# Patient Record
Sex: FEMALE | Race: WHITE | NOT HISPANIC OR LATINO
[De-identification: names, ages, dates, MRNs, and addresses within clinical notes are randomized per-mention and may not be internally consistent; named-entity substitution may affect disease eponyms.]

---

## 2017-01-12 ENCOUNTER — APPOINTMENT (OUTPATIENT)
Dept: RHEUMATOLOGY | Facility: CLINIC | Age: 32
End: 2017-01-12

## 2017-01-12 VITALS — WEIGHT: 194 LBS | SYSTOLIC BLOOD PRESSURE: 98 MMHG | DIASTOLIC BLOOD PRESSURE: 70 MMHG | BODY MASS INDEX: 30.84 KG/M2

## 2017-01-13 ENCOUNTER — RX RENEWAL (OUTPATIENT)
Age: 32
End: 2017-01-13

## 2017-01-13 LAB
25(OH)D3 SERPL-MCNC: 17.9 NG/ML
ALBUMIN SERPL ELPH-MCNC: 3.9 G/DL
ALP BLD-CCNC: 97 U/L
ALT SERPL-CCNC: 17 U/L
ANION GAP SERPL CALC-SCNC: 15 MMOL/L
APPEARANCE: CLEAR
AST SERPL-CCNC: 18 U/L
BASOPHILS # BLD AUTO: 0.02 K/UL
BASOPHILS NFR BLD AUTO: 0.6 %
BILIRUB SERPL-MCNC: <0.2 MG/DL
BILIRUBIN URINE: NEGATIVE
BLOOD URINE: NEGATIVE
BUN SERPL-MCNC: 13 MG/DL
CALCIUM SERPL-MCNC: 9.4 MG/DL
CHLORIDE SERPL-SCNC: 107 MMOL/L
CO2 SERPL-SCNC: 20 MMOL/L
COLOR: YELLOW
CREAT SERPL-MCNC: 0.69 MG/DL
EOSINOPHIL # BLD AUTO: 0.1 K/UL
EOSINOPHIL NFR BLD AUTO: 3.1 %
ERYTHROCYTE [SEDIMENTATION RATE] IN BLOOD BY WESTERGREN METHOD: 23 MM/HR
GLUCOSE QUALITATIVE U: NORMAL MG/DL
GLUCOSE SERPL-MCNC: 90 MG/DL
HCT VFR BLD CALC: 39.8 %
HGB BLD-MCNC: 12.2 G/DL
IMM GRANULOCYTES NFR BLD AUTO: 0 %
KETONES URINE: NEGATIVE
LEUKOCYTE ESTERASE URINE: NEGATIVE
LYMPHOCYTES # BLD AUTO: 1.4 K/UL
LYMPHOCYTES NFR BLD AUTO: 42.8 %
MAN DIFF?: NORMAL
MCHC RBC-ENTMCNC: 25.9 PG
MCHC RBC-ENTMCNC: 30.7 GM/DL
MCV RBC AUTO: 84.5 FL
MONOCYTES # BLD AUTO: 0.29 K/UL
MONOCYTES NFR BLD AUTO: 8.9 %
NEUTROPHILS # BLD AUTO: 1.46 K/UL
NEUTROPHILS NFR BLD AUTO: 44.6 %
NITRITE URINE: NEGATIVE
PH URINE: 7
PLATELET # BLD AUTO: 252 K/UL
POTASSIUM SERPL-SCNC: 4.8 MMOL/L
PROT SERPL-MCNC: 6.8 G/DL
PROTEIN URINE: NEGATIVE MG/DL
RBC # BLD: 4.71 M/UL
RBC # FLD: 16 %
SODIUM SERPL-SCNC: 142 MMOL/L
SPECIFIC GRAVITY URINE: 1.02
UROBILINOGEN URINE: NORMAL MG/DL
WBC # FLD AUTO: 3.27 K/UL

## 2017-02-01 ENCOUNTER — OUTPATIENT (OUTPATIENT)
Dept: OUTPATIENT SERVICES | Facility: HOSPITAL | Age: 32
LOS: 1 days | End: 2017-02-01
Payer: MEDICARE

## 2017-02-01 DIAGNOSIS — M35.00 SJOGREN SYNDROME, UNSPECIFIED: ICD-10-CM

## 2017-02-01 PROCEDURE — 94729 DIFFUSING CAPACITY: CPT

## 2017-02-01 PROCEDURE — 94726 PLETHYSMOGRAPHY LUNG VOLUMES: CPT | Mod: 26

## 2017-02-01 PROCEDURE — 94726 PLETHYSMOGRAPHY LUNG VOLUMES: CPT

## 2017-02-01 PROCEDURE — 94070 EVALUATION OF WHEEZING: CPT | Mod: 26

## 2017-02-01 PROCEDURE — 94060 EVALUATION OF WHEEZING: CPT

## 2017-02-01 PROCEDURE — 94760 N-INVAS EAR/PLS OXIMETRY 1: CPT

## 2017-02-01 PROCEDURE — 94729 DIFFUSING CAPACITY: CPT | Mod: 26

## 2017-03-06 ENCOUNTER — RX RENEWAL (OUTPATIENT)
Age: 32
End: 2017-03-06

## 2017-03-27 ENCOUNTER — APPOINTMENT (OUTPATIENT)
Dept: ENDOCRINOLOGY | Facility: CLINIC | Age: 32
End: 2017-03-27

## 2017-04-13 ENCOUNTER — APPOINTMENT (OUTPATIENT)
Dept: RHEUMATOLOGY | Facility: CLINIC | Age: 32
End: 2017-04-13

## 2017-04-27 ENCOUNTER — APPOINTMENT (OUTPATIENT)
Dept: ENDOCRINOLOGY | Facility: CLINIC | Age: 32
End: 2017-04-27

## 2017-04-27 VITALS
DIASTOLIC BLOOD PRESSURE: 85 MMHG | HEART RATE: 61 BPM | WEIGHT: 185 LBS | SYSTOLIC BLOOD PRESSURE: 127 MMHG | BODY MASS INDEX: 29.41 KG/M2

## 2017-05-02 ENCOUNTER — RESULT CHARGE (OUTPATIENT)
Age: 32
End: 2017-05-02

## 2017-05-02 ENCOUNTER — OTHER (OUTPATIENT)
Age: 32
End: 2017-05-02

## 2017-05-02 LAB
25(OH)D3 SERPL-MCNC: 29.8 NG/ML
ALBUMIN SERPL ELPH-MCNC: 4.2 G/DL
ALP BLD-CCNC: 87 U/L
ALT SERPL-CCNC: 13 U/L
ANION GAP SERPL CALC-SCNC: 19 MMOL/L
AST SERPL-CCNC: 12 U/L
BILIRUB SERPL-MCNC: <0.2 MG/DL
BUN SERPL-MCNC: 15 MG/DL
CALCIUM SERPL-MCNC: 9.6 MG/DL
CHLORIDE SERPL-SCNC: 104 MMOL/L
CO2 SERPL-SCNC: 19 MMOL/L
CREAT SERPL-MCNC: 0.77 MG/DL
GLUCOSE SERPL-MCNC: 86 MG/DL
POTASSIUM SERPL-SCNC: 4.2 MMOL/L
PROT SERPL-MCNC: 7.1 G/DL
SODIUM SERPL-SCNC: 142 MMOL/L
T3FREE SERPL-MCNC: 2.21 PG/ML
T4 FREE SERPL-MCNC: 1.1 NG/DL
TSH SERPL-ACNC: 0.96 UIU/ML

## 2017-05-11 ENCOUNTER — RX RENEWAL (OUTPATIENT)
Age: 32
End: 2017-05-11

## 2017-05-11 ENCOUNTER — APPOINTMENT (OUTPATIENT)
Dept: RHEUMATOLOGY | Facility: CLINIC | Age: 32
End: 2017-05-11

## 2017-05-11 VITALS — WEIGHT: 184 LBS | DIASTOLIC BLOOD PRESSURE: 65 MMHG | BODY MASS INDEX: 29.25 KG/M2 | SYSTOLIC BLOOD PRESSURE: 110 MMHG

## 2017-06-01 ENCOUNTER — APPOINTMENT (OUTPATIENT)
Dept: RHEUMATOLOGY | Facility: CLINIC | Age: 32
End: 2017-06-01

## 2017-06-19 ENCOUNTER — RX RENEWAL (OUTPATIENT)
Age: 32
End: 2017-06-19

## 2017-06-19 RX ORDER — UBIDECARENONE/VIT E ACET 100MG-5
50 MCG CAPSULE ORAL DAILY
Qty: 90 | Refills: 0 | Status: ACTIVE | COMMUNITY
Start: 2017-06-19 | End: 1900-01-01

## 2017-07-06 ENCOUNTER — RX RENEWAL (OUTPATIENT)
Age: 32
End: 2017-07-06

## 2017-09-07 ENCOUNTER — APPOINTMENT (OUTPATIENT)
Dept: RHEUMATOLOGY | Facility: CLINIC | Age: 32
End: 2017-09-07
Payer: MEDICARE

## 2017-09-07 VITALS — SYSTOLIC BLOOD PRESSURE: 110 MMHG | WEIGHT: 171 LBS | DIASTOLIC BLOOD PRESSURE: 70 MMHG | BODY MASS INDEX: 27.19 KG/M2

## 2017-09-07 PROCEDURE — 36415 COLL VENOUS BLD VENIPUNCTURE: CPT

## 2017-09-07 PROCEDURE — 99214 OFFICE O/P EST MOD 30 MIN: CPT | Mod: 25

## 2017-09-11 LAB
25(OH)D3 SERPL-MCNC: 27.3 NG/ML
ALBUMIN SERPL ELPH-MCNC: 4.2 G/DL
ALP BLD-CCNC: 73 U/L
ALT SERPL-CCNC: 12 U/L
AMYLASE/CREAT SERPL: 47 U/L
ANION GAP SERPL CALC-SCNC: 15 MMOL/L
AST SERPL-CCNC: 15 U/L
BASOPHILS # BLD AUTO: 0 K/UL
BASOPHILS NFR BLD AUTO: 0 %
BILIRUB SERPL-MCNC: 0.2 MG/DL
BUN SERPL-MCNC: 14 MG/DL
C3 SERPL-MCNC: 113 MG/DL
C4 SERPL-MCNC: 30 MG/DL
CALCIUM SERPL-MCNC: 9.7 MG/DL
CHLORIDE SERPL-SCNC: 107 MMOL/L
CO2 SERPL-SCNC: 20 MMOL/L
CREAT SERPL-MCNC: 0.7 MG/DL
EOSINOPHIL # BLD AUTO: 0.03 K/UL
EOSINOPHIL NFR BLD AUTO: 0.9 %
ERYTHROCYTE [SEDIMENTATION RATE] IN BLOOD BY WESTERGREN METHOD: 16 MM/HR
GLUCOSE SERPL-MCNC: 99 MG/DL
HCT VFR BLD CALC: 41.3 %
HGB BLD-MCNC: 13.1 G/DL
IMM GRANULOCYTES NFR BLD AUTO: 0.3 %
LPL SERPL-CCNC: 21 U/L
LYMPHOCYTES # BLD AUTO: 0.94 K/UL
LYMPHOCYTES NFR BLD AUTO: 29.6 %
MAN DIFF?: NORMAL
MCHC RBC-ENTMCNC: 28.4 PG
MCHC RBC-ENTMCNC: 31.7 GM/DL
MCV RBC AUTO: 89.4 FL
MONOCYTES # BLD AUTO: 0.22 K/UL
MONOCYTES NFR BLD AUTO: 6.9 %
NEUTROPHILS # BLD AUTO: 1.98 K/UL
NEUTROPHILS NFR BLD AUTO: 62.3 %
PLATELET # BLD AUTO: 232 K/UL
POTASSIUM SERPL-SCNC: 5.2 MMOL/L
PROT SERPL-MCNC: 7.4 G/DL
RBC # BLD: 4.62 M/UL
RBC # FLD: 13.7 %
SODIUM SERPL-SCNC: 142 MMOL/L
WBC # FLD AUTO: 3.18 K/UL

## 2017-10-30 ENCOUNTER — APPOINTMENT (OUTPATIENT)
Dept: ENDOCRINOLOGY | Facility: CLINIC | Age: 32
End: 2017-10-30

## 2017-11-29 ENCOUNTER — APPOINTMENT (OUTPATIENT)
Dept: ENDOCRINOLOGY | Facility: CLINIC | Age: 32
End: 2017-11-29
Payer: MEDICARE

## 2017-11-29 VITALS
BODY MASS INDEX: 24.72 KG/M2 | DIASTOLIC BLOOD PRESSURE: 79 MMHG | HEART RATE: 85 BPM | HEIGHT: 67 IN | WEIGHT: 157.5 LBS | SYSTOLIC BLOOD PRESSURE: 129 MMHG

## 2017-11-29 PROCEDURE — 99214 OFFICE O/P EST MOD 30 MIN: CPT | Mod: 25

## 2017-11-29 PROCEDURE — 36415 COLL VENOUS BLD VENIPUNCTURE: CPT

## 2017-11-30 ENCOUNTER — OTHER (OUTPATIENT)
Age: 32
End: 2017-11-30

## 2017-11-30 LAB
T4 FREE SERPL-MCNC: 1.4 NG/DL
TSH SERPL-ACNC: 0.08 UIU/ML

## 2018-01-11 ENCOUNTER — LABORATORY RESULT (OUTPATIENT)
Age: 33
End: 2018-01-11

## 2018-01-11 ENCOUNTER — APPOINTMENT (OUTPATIENT)
Dept: RHEUMATOLOGY | Facility: CLINIC | Age: 33
End: 2018-01-11
Payer: MEDICARE

## 2018-01-11 VITALS
BODY MASS INDEX: 24.28 KG/M2 | SYSTOLIC BLOOD PRESSURE: 120 MMHG | HEART RATE: 82 BPM | DIASTOLIC BLOOD PRESSURE: 69 MMHG | WEIGHT: 155 LBS

## 2018-01-11 PROCEDURE — 99214 OFFICE O/P EST MOD 30 MIN: CPT

## 2018-01-11 PROCEDURE — 36415 COLL VENOUS BLD VENIPUNCTURE: CPT

## 2018-01-12 LAB
ALBUMIN SERPL ELPH-MCNC: 3.8 G/DL
ALP BLD-CCNC: 73 U/L
ALT SERPL-CCNC: 17 U/L
ANION GAP SERPL CALC-SCNC: 14 MMOL/L
APPEARANCE: CLEAR
AST SERPL-CCNC: 13 U/L
BASOPHILS # BLD AUTO: 0 K/UL
BASOPHILS NFR BLD AUTO: 0 %
BILIRUB SERPL-MCNC: <0.2 MG/DL
BILIRUBIN URINE: NEGATIVE
BLOOD URINE: NEGATIVE
BUN SERPL-MCNC: 12 MG/DL
CALCIUM SERPL-MCNC: 9.3 MG/DL
CHLORIDE SERPL-SCNC: 112 MMOL/L
CO2 SERPL-SCNC: 21 MMOL/L
COLOR: YELLOW
CREAT SERPL-MCNC: 0.77 MG/DL
ENDOMYSIUM IGA SER QL: NEGATIVE
ENDOMYSIUM IGA TITR SER: NORMAL
EOSINOPHIL # BLD AUTO: 0.05 K/UL
EOSINOPHIL NFR BLD AUTO: 1.8
ERYTHROCYTE [SEDIMENTATION RATE] IN BLOOD BY WESTERGREN METHOD: 3 MM/HR
GLUCOSE QUALITATIVE U: NEGATIVE MG/DL
GLUCOSE SERPL-MCNC: 92 MG/DL
HCT VFR BLD CALC: 40.8 %
HGB BLD-MCNC: 13 G/DL
KETONES URINE: NEGATIVE
LEUKOCYTE ESTERASE URINE: NEGATIVE
LYMPHOCYTES # BLD AUTO: 0.88 K/UL
LYMPHOCYTES NFR BLD AUTO: 33.3 %
MAN DIFF?: NORMAL
MCHC RBC-ENTMCNC: 28.7 PG
MCHC RBC-ENTMCNC: 31.9 GM/DL
MCV RBC AUTO: 90.1 FL
MONOCYTES # BLD AUTO: 0.24 K/UL
MONOCYTES NFR BLD AUTO: 9 %
NEUTROPHILS # BLD AUTO: 1.4 K/UL
NEUTROPHILS NFR BLD AUTO: 53.2 %
NITRITE URINE: NEGATIVE
PH URINE: 5
PLATELET # BLD AUTO: 227 K/UL
POTASSIUM SERPL-SCNC: 5.2 MMOL/L
PROT SERPL-MCNC: 6.8 G/DL
PROTEIN URINE: NEGATIVE MG/DL
RBC # BLD: 4.53 M/UL
RBC # FLD: 13.6 %
SODIUM SERPL-SCNC: 147 MMOL/L
SPECIFIC GRAVITY URINE: 1.01
TTG IGA SER IA-ACNC: <5 UNITS
TTG IGA SER-ACNC: NEGATIVE
TTG IGG SER IA-ACNC: <5 UNITS
TTG IGG SER IA-ACNC: NEGATIVE
UROBILINOGEN URINE: NEGATIVE MG/DL
WBC # FLD AUTO: 2.64 K/UL

## 2018-01-14 ENCOUNTER — TRANSCRIPTION ENCOUNTER (OUTPATIENT)
Age: 33
End: 2018-01-14

## 2018-04-12 ENCOUNTER — APPOINTMENT (OUTPATIENT)
Dept: RHEUMATOLOGY | Facility: CLINIC | Age: 33
End: 2018-04-12
Payer: MEDICARE

## 2018-04-12 ENCOUNTER — LABORATORY RESULT (OUTPATIENT)
Age: 33
End: 2018-04-12

## 2018-04-12 VITALS — DIASTOLIC BLOOD PRESSURE: 60 MMHG | SYSTOLIC BLOOD PRESSURE: 112 MMHG

## 2018-04-12 VITALS — BODY MASS INDEX: 25.22 KG/M2 | WEIGHT: 161 LBS

## 2018-04-12 PROCEDURE — 99214 OFFICE O/P EST MOD 30 MIN: CPT | Mod: 25

## 2018-04-12 PROCEDURE — 36415 COLL VENOUS BLD VENIPUNCTURE: CPT

## 2018-04-16 LAB
ALBUMIN SERPL ELPH-MCNC: 3.8 G/DL
ALP BLD-CCNC: 76 U/L
ALT SERPL-CCNC: 11 U/L
ANION GAP SERPL CALC-SCNC: 11 MMOL/L
APPEARANCE: CLEAR
AST SERPL-CCNC: 13 U/L
BASOPHILS # BLD AUTO: 0 K/UL
BASOPHILS NFR BLD AUTO: 0 %
BILIRUB SERPL-MCNC: <0.2 MG/DL
BILIRUBIN URINE: NEGATIVE
BLOOD URINE: NEGATIVE
BUN SERPL-MCNC: 16 MG/DL
CALCIUM SERPL-MCNC: 9.1 MG/DL
CHLORIDE SERPL-SCNC: 111 MMOL/L
CO2 SERPL-SCNC: 20 MMOL/L
COLOR: YELLOW
CREAT SERPL-MCNC: 0.66 MG/DL
EOSINOPHIL # BLD AUTO: 0.04 K/UL
EOSINOPHIL NFR BLD AUTO: 1.4 %
ERYTHROCYTE [SEDIMENTATION RATE] IN BLOOD BY WESTERGREN METHOD: 8 MM/HR
GLUCOSE QUALITATIVE U: NEGATIVE MG/DL
GLUCOSE SERPL-MCNC: 94 MG/DL
HCT VFR BLD CALC: 38.1 %
HGB BLD-MCNC: 12.2 G/DL
IMM GRANULOCYTES NFR BLD AUTO: 0 %
KETONES URINE: NEGATIVE
LEUKOCYTE ESTERASE URINE: NEGATIVE
LYMPHOCYTES # BLD AUTO: 0.83 K/UL
LYMPHOCYTES NFR BLD AUTO: 29 %
MAGNESIUM SERPL-MCNC: 1.9 MG/DL
MAN DIFF?: NORMAL
MCHC RBC-ENTMCNC: 27.9 PG
MCHC RBC-ENTMCNC: 32 GM/DL
MCV RBC AUTO: 87.2 FL
MONOCYTES # BLD AUTO: 0.3 K/UL
MONOCYTES NFR BLD AUTO: 10.5 %
NEUTROPHILS # BLD AUTO: 1.69 K/UL
NEUTROPHILS NFR BLD AUTO: 59.1 %
NITRITE URINE: NEGATIVE
PH URINE: 6.5
PLATELET # BLD AUTO: 231 K/UL
POTASSIUM SERPL-SCNC: 4.8 MMOL/L
PROT SERPL-MCNC: 6.5 G/DL
PROTEIN URINE: NEGATIVE MG/DL
RBC # BLD: 4.37 M/UL
RBC # FLD: 14.1 %
SODIUM SERPL-SCNC: 142 MMOL/L
SPECIFIC GRAVITY URINE: 1.02
UROBILINOGEN URINE: NEGATIVE MG/DL
WBC # FLD AUTO: 2.86 K/UL

## 2018-04-26 ENCOUNTER — RX RENEWAL (OUTPATIENT)
Age: 33
End: 2018-04-26

## 2018-06-04 ENCOUNTER — APPOINTMENT (OUTPATIENT)
Dept: ENDOCRINOLOGY | Facility: CLINIC | Age: 33
End: 2018-06-04
Payer: MEDICARE

## 2018-06-04 VITALS
SYSTOLIC BLOOD PRESSURE: 120 MMHG | HEIGHT: 67 IN | HEART RATE: 85 BPM | DIASTOLIC BLOOD PRESSURE: 67 MMHG | BODY MASS INDEX: 26.37 KG/M2 | WEIGHT: 168 LBS

## 2018-06-04 PROCEDURE — 36415 COLL VENOUS BLD VENIPUNCTURE: CPT

## 2018-06-04 PROCEDURE — 99214 OFFICE O/P EST MOD 30 MIN: CPT | Mod: 25

## 2018-06-07 ENCOUNTER — OTHER (OUTPATIENT)
Age: 33
End: 2018-06-07

## 2018-07-02 ENCOUNTER — RX RENEWAL (OUTPATIENT)
Age: 33
End: 2018-07-02

## 2018-07-02 LAB
T3FREE SERPL-MCNC: 2.37 PG/ML
T4 FREE SERPL-MCNC: 1.7 NG/DL
TSH SERPL-ACNC: 0.42 UIU/ML

## 2018-07-11 ENCOUNTER — LABORATORY RESULT (OUTPATIENT)
Age: 33
End: 2018-07-11

## 2018-07-12 ENCOUNTER — APPOINTMENT (OUTPATIENT)
Dept: RHEUMATOLOGY | Facility: CLINIC | Age: 33
End: 2018-07-12
Payer: MEDICARE

## 2018-07-12 VITALS — BODY MASS INDEX: 28.19 KG/M2 | SYSTOLIC BLOOD PRESSURE: 105 MMHG | WEIGHT: 180 LBS | DIASTOLIC BLOOD PRESSURE: 72 MMHG

## 2018-07-12 PROCEDURE — 99214 OFFICE O/P EST MOD 30 MIN: CPT | Mod: 25

## 2018-07-12 PROCEDURE — 36415 COLL VENOUS BLD VENIPUNCTURE: CPT

## 2018-07-18 LAB
ALBUMIN SERPL ELPH-MCNC: 4.2 G/DL
ALP BLD-CCNC: 98 U/L
ALT SERPL-CCNC: 15 U/L
ANION GAP SERPL CALC-SCNC: 10 MMOL/L
APPEARANCE: CLEAR
AST SERPL-CCNC: 14 U/L
BASOPHILS # BLD AUTO: 0 K/UL
BASOPHILS NFR BLD AUTO: 0 %
BILIRUB SERPL-MCNC: <0.2 MG/DL
BILIRUBIN URINE: NEGATIVE
BLOOD URINE: NEGATIVE
BUN SERPL-MCNC: 11 MG/DL
C3 SERPL-MCNC: 121 MG/DL
C4 SERPL-MCNC: 31 MG/DL
CALCIUM SERPL-MCNC: 9.7 MG/DL
CHLORIDE SERPL-SCNC: 110 MMOL/L
CO2 SERPL-SCNC: 24 MMOL/L
COLOR: YELLOW
CREAT SERPL-MCNC: 0.63 MG/DL
EOSINOPHIL # BLD AUTO: 0.02 K/UL
EOSINOPHIL NFR BLD AUTO: 0.9 %
ERYTHROCYTE [SEDIMENTATION RATE] IN BLOOD BY WESTERGREN METHOD: 16 MM/HR
GLUCOSE QUALITATIVE U: NEGATIVE MG/DL
GLUCOSE SERPL-MCNC: 91 MG/DL
HCT VFR BLD CALC: 40.9 %
HGB BLD-MCNC: 12.7 G/DL
KETONES URINE: NEGATIVE
LEUKOCYTE ESTERASE URINE: NEGATIVE
LYMPHOCYTES # BLD AUTO: 1.4 K/UL
LYMPHOCYTES NFR BLD AUTO: 52.1 %
MAN DIFF?: NORMAL
MCHC RBC-ENTMCNC: 27.2 PG
MCHC RBC-ENTMCNC: 31.1 GM/DL
MCV RBC AUTO: 87.6 FL
MONOCYTES # BLD AUTO: 0.42 K/UL
MONOCYTES NFR BLD AUTO: 15.7 %
NEUTROPHILS # BLD AUTO: 0.73 K/UL
NEUTROPHILS NFR BLD AUTO: 27 %
NITRITE URINE: NEGATIVE
PH URINE: 7
PLATELET # BLD AUTO: 281 K/UL
POTASSIUM SERPL-SCNC: 5.6 MMOL/L
PROT SERPL-MCNC: 7 G/DL
PROTEIN URINE: NEGATIVE MG/DL
RBC # BLD: 4.67 M/UL
RBC # FLD: 13.9 %
SODIUM SERPL-SCNC: 144 MMOL/L
SPECIFIC GRAVITY URINE: 1.01
UROBILINOGEN URINE: NEGATIVE MG/DL
WBC # FLD AUTO: 2.69 K/UL

## 2018-09-26 ENCOUNTER — RX RENEWAL (OUTPATIENT)
Age: 33
End: 2018-09-26

## 2018-09-27 ENCOUNTER — RX RENEWAL (OUTPATIENT)
Age: 33
End: 2018-09-27

## 2018-10-16 ENCOUNTER — APPOINTMENT (OUTPATIENT)
Dept: ENDOCRINOLOGY | Facility: CLINIC | Age: 33
End: 2018-10-16
Payer: MEDICARE

## 2018-10-16 VITALS
WEIGHT: 184 LBS | HEART RATE: 81 BPM | SYSTOLIC BLOOD PRESSURE: 121 MMHG | HEIGHT: 67 IN | DIASTOLIC BLOOD PRESSURE: 84 MMHG | BODY MASS INDEX: 28.88 KG/M2

## 2018-10-16 PROCEDURE — 36415 COLL VENOUS BLD VENIPUNCTURE: CPT

## 2018-10-16 PROCEDURE — 99214 OFFICE O/P EST MOD 30 MIN: CPT | Mod: 25

## 2018-10-18 ENCOUNTER — LABORATORY RESULT (OUTPATIENT)
Age: 33
End: 2018-10-18

## 2018-10-18 ENCOUNTER — APPOINTMENT (OUTPATIENT)
Dept: RHEUMATOLOGY | Facility: CLINIC | Age: 33
End: 2018-10-18
Payer: MEDICARE

## 2018-10-18 VITALS
TEMPERATURE: 98.3 F | DIASTOLIC BLOOD PRESSURE: 60 MMHG | HEIGHT: 67 IN | WEIGHT: 185 LBS | RESPIRATION RATE: 16 BRPM | BODY MASS INDEX: 29.03 KG/M2 | SYSTOLIC BLOOD PRESSURE: 134 MMHG | HEART RATE: 104 BPM | OXYGEN SATURATION: 98 %

## 2018-10-18 PROCEDURE — 99214 OFFICE O/P EST MOD 30 MIN: CPT

## 2018-10-21 LAB
25(OH)D3 SERPL-MCNC: 20.1 NG/ML
APPEARANCE: CLEAR
BASOPHILS # BLD AUTO: 0.02 K/UL
BASOPHILS NFR BLD AUTO: 0.9 %
BILIRUBIN URINE: NEGATIVE
BLOOD URINE: NEGATIVE
C3 SERPL-MCNC: 127 MG/DL
C4 SERPL-MCNC: 30 MG/DL
COLOR: YELLOW
EOSINOPHIL # BLD AUTO: 0.02 K/UL
EOSINOPHIL NFR BLD AUTO: 0.9 %
ERYTHROCYTE [SEDIMENTATION RATE] IN BLOOD BY WESTERGREN METHOD: 5 MM/HR
GLUCOSE QUALITATIVE U: NEGATIVE MG/DL
HCT VFR BLD CALC: 38.4 %
HGB BLD-MCNC: 11.9 G/DL
KETONES URINE: NEGATIVE
LEUKOCYTE ESTERASE URINE: NEGATIVE
LYMPHOCYTES # BLD AUTO: 1.14 K/UL
LYMPHOCYTES NFR BLD AUTO: 49.9 %
MAN DIFF?: NORMAL
MCHC RBC-ENTMCNC: 26.4 PG
MCHC RBC-ENTMCNC: 31 GM/DL
MCV RBC AUTO: 85.1 FL
MONOCYTES # BLD AUTO: 0.33 K/UL
MONOCYTES NFR BLD AUTO: 14.3 %
NEUTROPHILS # BLD AUTO: 0.72 K/UL
NEUTROPHILS NFR BLD AUTO: 31.3 %
NITRITE URINE: NEGATIVE
PH URINE: 6.5
PLATELET # BLD AUTO: 283 K/UL
PROTEIN URINE: NEGATIVE MG/DL
RBC # BLD: 4.51 M/UL
RBC # FLD: 14.9 %
SPECIFIC GRAVITY URINE: 1.01
UROBILINOGEN URINE: NEGATIVE MG/DL
WBC # FLD AUTO: 2.29 K/UL

## 2018-10-31 ENCOUNTER — RX RENEWAL (OUTPATIENT)
Age: 33
End: 2018-10-31

## 2018-11-18 LAB
ALBUMIN SERPL ELPH-MCNC: 4.2 G/DL
ALP BLD-CCNC: 100 U/L
ALT SERPL-CCNC: 16 U/L
ANION GAP SERPL CALC-SCNC: 14 MMOL/L
AST SERPL-CCNC: 14 U/L
BILIRUB SERPL-MCNC: <0.2 MG/DL
BUN SERPL-MCNC: 13 MG/DL
CALCIUM SERPL-MCNC: 9.7 MG/DL
CHLORIDE SERPL-SCNC: 112 MMOL/L
CHOLEST SERPL-MCNC: 197 MG/DL
CHOLEST/HDLC SERPL: 3.6 RATIO
CO2 SERPL-SCNC: 21 MMOL/L
CREAT SERPL-MCNC: 0.81 MG/DL
GLUCOSE SERPL-MCNC: 89 MG/DL
HBA1C MFR BLD HPLC: 5.6 %
HDLC SERPL-MCNC: 54 MG/DL
LDLC SERPL CALC-MCNC: 125 MG/DL
POTASSIUM SERPL-SCNC: 4.9 MMOL/L
PROT SERPL-MCNC: 7.1 G/DL
SODIUM SERPL-SCNC: 147 MMOL/L
T4 FREE SERPL-MCNC: 1.4 NG/DL
TRIGL SERPL-MCNC: 92 MG/DL
TSH SERPL-ACNC: 0.08 UIU/ML

## 2019-01-03 ENCOUNTER — CLINICAL ADVICE (OUTPATIENT)
Age: 34
End: 2019-01-03

## 2019-01-18 ENCOUNTER — OTHER (OUTPATIENT)
Age: 34
End: 2019-01-18

## 2019-01-31 ENCOUNTER — LABORATORY RESULT (OUTPATIENT)
Age: 34
End: 2019-01-31

## 2019-01-31 ENCOUNTER — APPOINTMENT (OUTPATIENT)
Dept: RHEUMATOLOGY | Facility: CLINIC | Age: 34
End: 2019-01-31
Payer: MEDICARE

## 2019-01-31 VITALS
DIASTOLIC BLOOD PRESSURE: 70 MMHG | WEIGHT: 193 LBS | HEART RATE: 98 BPM | TEMPERATURE: 97.8 F | BODY MASS INDEX: 30.29 KG/M2 | HEIGHT: 67 IN | SYSTOLIC BLOOD PRESSURE: 134 MMHG | OXYGEN SATURATION: 98 % | RESPIRATION RATE: 16 BRPM

## 2019-01-31 DIAGNOSIS — M32.9 SYSTEMIC LUPUS ERYTHEMATOSUS, UNSPECIFIED: ICD-10-CM

## 2019-01-31 PROCEDURE — 36415 COLL VENOUS BLD VENIPUNCTURE: CPT

## 2019-01-31 PROCEDURE — 99214 OFFICE O/P EST MOD 30 MIN: CPT | Mod: 25

## 2019-01-31 NOTE — HISTORY OF PRESENT ILLNESS
[FreeTextEntry1] : 33 year old female with Sjogren's, with Ro+, hypothyroid, leukopenia, also with history c/w fibromyalgia, irritable bowel syndrome (bloating) anxiety and multiple non-drug allergies, followed by pain management in past, neurology, ortho and GI. Primary issues have been of generalized pain. On long term prednisone with difficulty tapering, on 4mg/d since ~early 2015. 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 Tried pilocarpine for dryness, but unable to tolerate.\par \par --Received an epidural into lumbar spine 6/15/16with excellent relief of low back pain (which radiated to pelvis and legs). Previous pain physicians included Dr Case, but insurance no longer taken; had been followed by Dr Morel ([facility])--last appointment in July '16--told that MRI of c-spine were without change. Continues on lyrica and topamax; \par \par s/p fall in May'2016--w surgery (9/16) for ruptured flexor tendon right hand and ulnar nerve release at right elbow.\par \par 9/7/17 Feels that everything has been bad this summer with c/o fatigue, arthralgia, myalgia, chest pain, nausea, bloating, hair loss. No photosensitivity, rash, oral ulcers, joint swelling. Has been off lyrica since ~Nov 2016 (didn't have a neurologist) but not restarted as she is contemplating pregnancy. She often misses a dose of plaquenil (taking bid). Using flexeril. Continues on pred 4mg (unable to decrease to 3mg). Now followed by Dr Johnson (neurology)--mri of pituitary with a "finding"--question aneurism and she is to have follow-up with a mra\par \par 1/11/2018 Has lost 50 lbs since last April.  Has been seen by GI, had endoscopy and colonoscopy at [facility] and she will have results next week. She does have constipation/diarrhea/loss of appetite.  Amylase/lipase were normal  She was told of "inflammation".  Saw Dr Ray (endocrine) and told of thyroid nodules--no sonogram yet secondary to scheduling difficulties.  Continues on plaquenil 400mg at bedtime, continues on prednisone 4mg, ran out of flexeril last month and notes that she is "out of whack" with lower back pain, more joint pain in her mcp's, pip's, diip's, wrists, shoulders, ankles, hips (not in groin) spares knees; also w pain in calves.  Received an injection into her right SI joint in 12/7/17 by pain management. Saw optomitrist 11/28, and had multiple tests--told that everything was fine. Additionally taken off of lyrica as she was considering pregnancy.\par \par 4/12/18- ruptured eardrum on feb 19th, told of difficulties with balance (Mar 26) by ENT and is scheduled for testing April 30th  (Dr Elio Dupree); Also seeing a new neurologist--repeat MRI of pituitary gland reportedly showed nothing.  Placed back on lyrica last Friday. No additional weight loss, and she has a repeat GI appointment; saw optho for plaquenil toxicity this week--given a script for dry eyes but not covered by insurance--using tears and pazao (anti-histamine drops).  No fevers, +night sweats, feels that her legs "are on fire". Started [employer] program last week.\par \par 7/12/18--f/uliving in a shelter with her son, no kitchen facilities.  Meals are prepared for her and she has had difficulties tolerating the food and notes skin rashes, stiffness and feeling swollen.  Fell off the [employer] program; has seen an allergist and was given a ketoconizole shampoo and referred to dermatoloogy (but no appointments until November).  Also seen by a voice specialist and told by a muscle tension; receiving balance therapy (after ruptured eardrup).  No fever, anorexia, weight loss, alopecia, +fatigue and generalized pain. \par \par 10/18/18 f/u fibromyalgia, sjogren's syndrome--continuing with vestibular (balance) therapy--told that her eyes may be causing her dizzyness.  To review: dizziness  increased after rupture of right eardrum (secondary to a sinus infection in February).  Major complaint is of fatigue (under stress with social situation), Also with pain in shoulders, low back, hips, legs.notes pruritiis of her legs since August-hasn't changed detergent, but she scratches to point of bruising--saw endocrine 2 days ago and labs checked (nl lft's and bili); she has gained 30lbs since March. Continues on plaquenil 400mg, prednisone 4mg.  Saw neurology on Monday (and will be getting a f/u MRI/MRA for f/u pituitary).  Now taking flexeril at bedtime, also with increase in lyrica.  TSH checked by endocrine 2 days ago (low)\par \par 1/31/19  f/u SS, fibromyalgia--followed by neurology/pain, last seen in November (missed appointment this month secondary to her son's illness).  Major complaint is of fatigue--she is not sleeping well, using trazadone (but not during the weekdays), however, she additionally states that she takes her prednisone at night.  She tried melatonin for 3 days but stopped secondary to multiple side effects including headache, dizziness and fatigue.  She has seen a new psych 12/4/18 and was restarted on trazadone and zolft.  Doesn't remember the last time she was at pain management.  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  Pain continues (lower back, legs, shoulders, neck) and is worsened by rest and if she thinks about it, better with heat; additionally notes swelling of fingers. Was able to decrease  prednisone to 4 mg alternating with 3mg without noticing a change in her symptoms. Couldn't tolerate addition of am dose of flexeril.  Bunions (bilateral) painful (but has been told that she cannot have surgery because of Raynaud's.  Notes alopecia (feels her hair is thin), +Raynaud's, no fever, weight loss,lymphadenopathy.  Was referred to allergy for pruritis as well as multiple "food reactions" at last vist, but she did not go; she is additionally looking for a new PCP (her current pcp did not renew her , which, can only be renewed by the pcp). \par \par Meds: Prednisone 4mg/3mg, Plaquenil 400mg (last vft in 4/9/18),  xyzal/Allegra 180, metamucil prn, synthroid 175,Topamax 150mg qhs, omprazole, , delaira,  Zoloft 100mg, Lyrica  100mg tid (increased on Monday from 75mg previously on 300mg pm 100 in am 200 in pm, Trazadone 100mg prn,  cyclobenzprine 10mg q hs,; off:,  , trileptal, diclofenac 75 prn, naprosyn 500mg, tylenol 3 prn, no longer using a TENS machine (in storage)\par \par PE: 193 lbs 134/70\par No rash, no alopecia, no oral or nasal ulcers, livedo, nodules, or lymphadenopathy. No synovitis of joints, no swelling (she notes that her fingers look swollen to her); she continues with diffuse tenderness affecting dip's, pip's mcp's wrists, shoulders, knees, ankles, but additionally of tendons, ligaments, muscles, trochanteric bursae. General exam per emr.  wnl-\par \par Imp\par Sjogrens with fibromyalgia on flexeril and lyrica.  Able to tolerate prednisone decrease to 4mg/3mg.   No evidence of active inflammation on exam but continues with multiple complaints of pain tenderness/cramping.  TSH low--she has an appointment scheduled with endocrine 2/4 (have discussed that this may be contributing to her symptoms).  Will continue plaquenil, will decrease pred to 4mg M,W,Fand 3mg Tu,Th Sa,Sun.  Patient to f/u with neurology, pain management, psych and will seek a new pcp.. Will check labs including thyroid and vit D for her endocrine visit\par f/u 3-4 months. \par HM-Pt has received flu and pneumonia shot last year

## 2019-01-31 NOTE — PHYSICAL EXAM
[General Appearance - Alert] : alert [General Appearance - Well Nourished] : well nourished [General Appearance - Well Developed] : well developed [Sclera] : the sclera and conjunctiva were normal [Outer Ear] : the ears and nose were normal in appearance [Neck Appearance] : the appearance of the neck was normal [Auscultation Breath Sounds / Voice Sounds] : lungs were clear to auscultation bilaterally [Heart Sounds] : normal S1 and S2 [Murmurs] : no murmurs [Heart Sounds Pericardial Friction Rub] : no pericardial rub [Bowel Sounds] : normal bowel sounds [Cervical Lymph Nodes Enlarged Posterior Bilaterally] : posterior cervical [Cervical Lymph Nodes Enlarged Anterior Bilaterally] : anterior cervical [Supraclavicular Lymph Nodes Enlarged Bilaterally] : supraclavicular [Abnormal Walk] : normal gait [Nail Clubbing] : no clubbing  or cyanosis of the fingernails [] : no rash [Skin Lesions] : no skin lesions [FreeTextEntry1] : see hpi

## 2019-02-03 LAB
25(OH)D3 SERPL-MCNC: 18.2 NG/ML
ALBUMIN SERPL ELPH-MCNC: 4.1 G/DL
ALP BLD-CCNC: 87 U/L
ALT SERPL-CCNC: 16 U/L
ANION GAP SERPL CALC-SCNC: 12 MMOL/L
AST SERPL-CCNC: 15 U/L
BASOPHILS # BLD AUTO: 0.02 K/UL
BASOPHILS NFR BLD AUTO: 0.6 %
BILIRUB SERPL-MCNC: <0.2 MG/DL
BUN SERPL-MCNC: 17 MG/DL
CALCIUM SERPL-MCNC: 9 MG/DL
CHLORIDE SERPL-SCNC: 111 MMOL/L
CO2 SERPL-SCNC: 19 MMOL/L
CREAT SERPL-MCNC: 0.7 MG/DL
EOSINOPHIL # BLD AUTO: 0.06 K/UL
EOSINOPHIL NFR BLD AUTO: 1.9 %
ERYTHROCYTE [SEDIMENTATION RATE] IN BLOOD BY WESTERGREN METHOD: 23 MM/HR
GLUCOSE SERPL-MCNC: 102 MG/DL
HCT VFR BLD CALC: 37 %
HGB BLD-MCNC: 11.4 G/DL
IMM GRANULOCYTES NFR BLD AUTO: 0 %
LYMPHOCYTES # BLD AUTO: 0.93 K/UL
LYMPHOCYTES NFR BLD AUTO: 29.3 %
MAN DIFF?: NORMAL
MCHC RBC-ENTMCNC: 25.4 PG
MCHC RBC-ENTMCNC: 30.8 GM/DL
MCV RBC AUTO: 82.4 FL
MONOCYTES # BLD AUTO: 0.28 K/UL
MONOCYTES NFR BLD AUTO: 8.8 %
NEUTROPHILS # BLD AUTO: 1.88 K/UL
NEUTROPHILS NFR BLD AUTO: 59.4 %
PLATELET # BLD AUTO: 258 K/UL
POTASSIUM SERPL-SCNC: 4.1 MMOL/L
PROT SERPL-MCNC: 7.1 G/DL
RBC # BLD: 4.49 M/UL
RBC # FLD: 15.2 %
SODIUM SERPL-SCNC: 142 MMOL/L
T3RU NFR SERPL: 0.9 INDEX
T4 SERPL-MCNC: 7.9 UG/DL
TSH SERPL-ACNC: 0.16 UIU/ML
WBC # FLD AUTO: 3.17 K/UL

## 2019-02-04 ENCOUNTER — APPOINTMENT (OUTPATIENT)
Dept: ENDOCRINOLOGY | Facility: CLINIC | Age: 34
End: 2019-02-04
Payer: MEDICARE

## 2019-02-04 VITALS
DIASTOLIC BLOOD PRESSURE: 82 MMHG | BODY MASS INDEX: 30.29 KG/M2 | HEIGHT: 67 IN | SYSTOLIC BLOOD PRESSURE: 127 MMHG | HEART RATE: 111 BPM | WEIGHT: 193 LBS

## 2019-02-04 DIAGNOSIS — R63.5 ABNORMAL WEIGHT GAIN: ICD-10-CM

## 2019-02-04 PROCEDURE — 99215 OFFICE O/P EST HI 40 MIN: CPT

## 2019-02-04 NOTE — REVIEW OF SYSTEMS
[Recent Weight Loss (___ Lbs)] : recent [unfilled] ~Ulb weight loss [Palpitations] : palpitations [Negative] : Heme/Lymph [Headache] : no headaches [FreeTextEntry2] : tired  [FreeTextEntry4] : no difficulty swelling  [FreeTextEntry9] : LE edema in the afternoons

## 2019-02-04 NOTE — END OF VISIT
[FreeTextEntry3] : This note was authored by Shabana Robbins, working as a medical scribe for Dr. Christopher ZABALA). The note was reviewed, edited and revised by Dr. Ray, who is in agreement with the exam findings, imaging findings and treatment plan. 02/04/2019.  9:40AM.  [>50% of Time Spent on Counseling for ____] : Greater than 50% of the encounter time was spent on counseling for [unfilled] [Time Spent: ___ minutes] : I have spent [unfilled] minutes of face to face time with the patient

## 2019-02-04 NOTE — ASSESSMENT
[FreeTextEntry1] : 1. Hypothyroidism dx approximately in 2014. In November of 2018, TSH was low. Pt is on Levothyroxine 175 mcg. Pt is c/o palpitations, weight gain and increased tiredness. Pt had a recent blood test done on 1/31/19; TSH is 0.16, total T4 7.9. TSH is low; might be related to Prednisone use rather than hyperthyroidism state. \par \par 2. Rapid weight gain; BMI 30.2. Pt is at increased risk to develop pre-diabetes/diabetes ( In August 2018, A1c 5.6%). Recommend increased physical activity. Appointment with nutritionist and exercise specialist.\par \par \par  [Importance of Diet and Exercise] : importance of diet and exercise to improve glycemic control, achieve weight loss and improve cardiovascular health

## 2019-02-04 NOTE — HISTORY OF PRESENT ILLNESS
[FreeTextEntry1] : 34 y/o F pt with a PMHx of rheumatological disorders, hypothyroidism (on Levothyroxine 175 mcg), pituitary microadenoma presents today for thyroid f/u visit. Pt also with a Hx of elevated hemoglobin A1c levels.Pt states she had a routine f/u with rheumatologist last week with no changes in medication doses yet. Pt is currently on a tapering dose of Prednisone. \par \par Today, pt c/o feeling more tired than usual. Pt also tends to feel cold with occasional palpitations. Pt reports weight gain which she relates to stress; 161 in April 2018, 193 today). Pt relates to have some lower extremity edema in the afternoons. Pt denies headaches or difficulty swallowing. \par \par Pt with new medications of Zoloft 100mg and Trazodone 100mg. Topamax and Trileptal were discontinued. \par \par Pt had MRI 12/2018 for her pituitary microadenoma was stable as per patient. \par 04/2018 thyroid US shows no nodules. \par 10/2018: pt's TSH was low at 0.08. \par \par  \par \par \par

## 2019-02-28 ENCOUNTER — APPOINTMENT (OUTPATIENT)
Dept: ENDOCRINOLOGY | Facility: CLINIC | Age: 34
End: 2019-02-28
Payer: MEDICARE

## 2019-02-28 VITALS — BODY MASS INDEX: 30.85 KG/M2 | WEIGHT: 197 LBS

## 2019-02-28 PROCEDURE — 97802 MEDICAL NUTRITION INDIV IN: CPT

## 2019-05-13 ENCOUNTER — APPOINTMENT (OUTPATIENT)
Dept: ENDOCRINOLOGY | Facility: CLINIC | Age: 34
End: 2019-05-13

## 2019-05-16 ENCOUNTER — APPOINTMENT (OUTPATIENT)
Dept: RHEUMATOLOGY | Facility: CLINIC | Age: 34
End: 2019-05-16
Payer: MEDICARE

## 2019-05-16 ENCOUNTER — APPOINTMENT (OUTPATIENT)
Dept: ENDOCRINOLOGY | Facility: CLINIC | Age: 34
End: 2019-05-16

## 2019-05-16 VITALS
TEMPERATURE: 98.1 F | HEART RATE: 101 BPM | WEIGHT: 194 LBS | BODY MASS INDEX: 30.45 KG/M2 | SYSTOLIC BLOOD PRESSURE: 118 MMHG | OXYGEN SATURATION: 98 % | RESPIRATION RATE: 20 BRPM | HEIGHT: 67 IN | DIASTOLIC BLOOD PRESSURE: 64 MMHG

## 2019-05-16 DIAGNOSIS — M79.7 FIBROMYALGIA: ICD-10-CM

## 2019-05-16 PROCEDURE — 99214 OFFICE O/P EST MOD 30 MIN: CPT

## 2019-05-16 NOTE — PHYSICAL EXAM
[General Appearance - Alert] : alert [General Appearance - Well Nourished] : well nourished [General Appearance - Well Developed] : well developed [Sclera] : the sclera and conjunctiva were normal [Outer Ear] : the ears and nose were normal in appearance [Neck Appearance] : the appearance of the neck was normal [Auscultation Breath Sounds / Voice Sounds] : lungs were clear to auscultation bilaterally [Heart Sounds] : normal S1 and S2 [Murmurs] : no murmurs [Heart Sounds Pericardial Friction Rub] : no pericardial rub [Cervical Lymph Nodes Enlarged Posterior Bilaterally] : posterior cervical [Bowel Sounds] : normal bowel sounds [Cervical Lymph Nodes Enlarged Anterior Bilaterally] : anterior cervical [Supraclavicular Lymph Nodes Enlarged Bilaterally] : supraclavicular [Nail Clubbing] : no clubbing  or cyanosis of the fingernails [Abnormal Walk] : normal gait [] : no rash [Skin Lesions] : no skin lesions [FreeTextEntry1] : see hpi

## 2019-05-16 NOTE — HISTORY OF PRESENT ILLNESS
[FreeTextEntry1] : 33 year old female with Sjogren's, with Ro+, hypothyroid, leukopenia, also with history c/w fibromyalgia, irritable bowel syndrome (bloating) anxiety and multiple non-drug allergies, followed by pain management in past, neurology, ortho and GI. Primary issues have been of generalized pain. On long term prednisone with difficulty tapering, on 4mg/d since ~early 2015. 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 Tried pilocarpine for dryness, but unable to tolerate.\par \par --Received an epidural into lumbar spine 6/15/16with excellent relief of low back pain (which radiated to pelvis and legs). Previous pain physicians included Dr Case, but insurance no longer taken; had been followed by Dr Morel ([facility])--last appointment in July '16--told that MRI of c-spine were without change. Continues on lyrica and topamax; \par \par s/p fall in May'2016--w surgery (9/16) for ruptured flexor tendon right hand and ulnar nerve release at right elbow.\par \par 9/7/17 Feels that everything has been bad this summer with c/o fatigue, arthralgia, myalgia, chest pain, nausea, bloating, hair loss. No photosensitivity, rash, oral ulcers, joint swelling. Has been off lyrica since ~Nov 2016 (didn't have a neurologist) but not restarted as she is contemplating pregnancy. She often misses a dose of plaquenil (taking bid). Using flexeril. Continues on pred 4mg (unable to decrease to 3mg). Now followed by Dr Johnson (neurology)--mri of pituitary with a "finding"--question aneurism and she is to have follow-up with a mra\par \par 1/11/2018 Has lost 50 lbs since last April.  Has been seen by GI, had endoscopy and colonoscopy at [facility] and she will have results next week. She does have constipation/diarrhea/loss of appetite.  Amylase/lipase were normal  She was told of "inflammation".  Saw Dr Ray (endocrine) and told of thyroid nodules--no sonogram yet secondary to scheduling difficulties.  Continues on plaquenil 400mg at bedtime, continues on prednisone 4mg, ran out of flexeril last month and notes that she is "out of whack" with lower back pain, more joint pain in her mcp's, pip's, diip's, wrists, shoulders, ankles, hips (not in groin) spares knees; also w pain in calves.  Received an injection into her right SI joint in 12/7/17 by pain management. Saw optomitrist 11/28, and had multiple tests--told that everything was fine. Additionally taken off of lyrica as she was considering pregnancy.\par \par 4/12/18- ruptured eardrum on feb 19th, told of difficulties with balance (Mar 26) by ENT and is scheduled for testing April 30th  (Dr Elio Dupree); Also seeing a new neurologist--repeat MRI of pituitary gland reportedly showed nothing.  Placed back on lyrica last Friday. No additional weight loss, and she has a repeat GI appointment; saw optho for plaquenil toxicity this week--given a script for dry eyes but not covered by insurance--using tears and pazao (anti-histamine drops).  No fevers, +night sweats, feels that her legs "are on fire". Started [employer] program last week.\par \par 7/12/18--f/uliving in a shelter with her son, no kitchen facilities.  Meals are prepared for her and she has had difficulties tolerating the food and notes skin rashes, stiffness and feeling swollen.  Fell off the [employer] program; has seen an allergist and was given a ketoconizole shampoo and referred to dermatoloogy (but no appointments until November).  Also seen by a voice specialist and told by a muscle tension; receiving balance therapy (after ruptured eardrup).  No fever, anorexia, weight loss, alopecia, +fatigue and generalized pain. \par \par 10/18/18 f/u fibromyalgia, sjogren's syndrome--continuing with vestibular (balance) therapy--told that her eyes may be causing her dizzyness.  To review: dizziness  increased after rupture of right eardrum (secondary to a sinus infection in February).  Major complaint is of fatigue (under stress with social situation), Also with pain in shoulders, low back, hips, legs.notes pruritiis of her legs since August-hasn't changed detergent, but she scratches to point of bruising--saw endocrine 2 days ago and labs checked (nl lft's and bili); she has gained 30lbs since March. Continues on plaquenil 400mg, prednisone 4mg.  Saw neurology on Monday (and will be getting a f/u MRI/MRA for f/u pituitary).  Now taking flexeril at bedtime, also with increase in lyrica.  TSH checked by endocrine 2 days ago (low)\par \par 1/31/19  f/u SS, fibromyalgia--followed by neurology/pain, last seen in November (missed appointment this month secondary to her son's illness).  Major complaint is of fatigue--she is not sleeping well, using trazadone (but not during the weekdays); saw a new psych 12/4/18.  Doesn't remember the last time she was at pain management.  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  Pain continues (lower back, legs, shoulders, neck) and is worsened by rest and if she thinks about it, better with heat; additionally notes swelling of fingers. Was able to decrease  prednisone to 4 mg alternating with 3mg without noticing a change. (is taking prednisone at night). Bunions (bilateral) painful (but has been told that she cannot have surgery because of Raynaud's.  Notes alopecia (feels her hair is thin), +Raynaud's, no fever, weight loss,lymphadenopathy, \par 5/16/19  f/u SS, fibromyalgia--   ran out of lyrica (pain in lower back is increasing) for ~1 month and hasn't been back to neurologist; she is s/p a fall taking her son to school on 4/23/19, didn't go to ER or urgent care nor has she made an appointment with her neurologist ([place]); has additionally not returned to her pcp.  She continues without change in her multiple complaints/symptoms--continues with fatigue and continues to not sleep well (using trazadone (but not during the weekdays); Generalized pain continues in lower back, legs, shoulders, neck) and is worsened by rest and if she thinks about it, better with heat; (and she has not returned to pain management).  She has been able to tolerated the prednisone 4 mg alternating with 3mg.  Social issues unchanged (has been in shetlers and has moved 3 x over the past year).   Bunions (bilateral) painful (but has been told that she cannot have surgery because of Raynaud's.  States that her hair is thin, +Raynaud's, no fever, weight loss,lymphadenopathy, \par Meds: Prednisone 4mg/3mg, Plaquenil 400mg (last vft in 4/9/18),  xyzal/Allegra 180, metamucil prn, synthroid 175,Topamax 150mg qhs, omprazole, , delaira,  Zoloft 100mg, cyclobenzprine (previously on 10mg q hs), off: trileptal, diclofenac 75 prn, naprosyn 500mg, tylenol 3 prn, Lyrica  100mg tid (increased on Monday from 75mg previously on 300mg pm 100 in am 200 in pm, trazoft, no longer using a TENS machine (in storage), off \par \par PE: 185 lbs 134/60\par No rash, no alopecia, hair is growing, no oral or nasal ulcers, livedo, nodules, or lymphadenopathy. No synovitis of joints, no swelling but continues with diffuse tenderness affecting bilateral 1st mtp's (bunions),  dip's, pip's mcp's wrists, shoulders, knees, ankles, but additionally of tendons, ligaments, muscles, trochanteric bursae, medial and lateral epicondyls. General exam per emr.  wnl-\par \par Imp\par Sjogrens with fibromyalgia now back on flexeril but off lyrica.   No evidence of active inflammation on exam but continues with multiple complaints of pain.   Will continue plaquenil, will decrease pred to 4mg tiw and 3mg 4x d.  Patient to try flexeril in the am as well as at bedtime.  Patient to f/u with Dr Santa [place], neurology, podiatyr.. Will check labs f/u 3-4 months. \par Patient to make optho appointment for plaquenil check\par Will give vit D\par Will give nifedipine xr 30; patient to f/u with podiatry re possible bunion surgery\par HM-Pt has received flu and pneumonia shot last year

## 2019-05-27 ENCOUNTER — OTHER (OUTPATIENT)
Age: 34
End: 2019-05-27

## 2019-05-30 ENCOUNTER — APPOINTMENT (OUTPATIENT)
Dept: INTERNAL MEDICINE | Facility: CLINIC | Age: 34
End: 2019-05-30

## 2019-06-13 ENCOUNTER — APPOINTMENT (OUTPATIENT)
Dept: INTERNAL MEDICINE | Facility: CLINIC | Age: 34
End: 2019-06-13

## 2019-07-02 ENCOUNTER — APPOINTMENT (OUTPATIENT)
Dept: INTERNAL MEDICINE | Facility: CLINIC | Age: 34
End: 2019-07-02

## 2019-07-23 ENCOUNTER — APPOINTMENT (OUTPATIENT)
Dept: INTERNAL MEDICINE | Facility: CLINIC | Age: 34
End: 2019-07-23

## 2019-07-24 ENCOUNTER — TRANSCRIPTION ENCOUNTER (OUTPATIENT)
Age: 34
End: 2019-07-24

## 2019-08-05 ENCOUNTER — APPOINTMENT (OUTPATIENT)
Dept: ENDOCRINOLOGY | Facility: CLINIC | Age: 34
End: 2019-08-05

## 2019-09-19 ENCOUNTER — APPOINTMENT (OUTPATIENT)
Dept: RHEUMATOLOGY | Facility: CLINIC | Age: 34
End: 2019-09-19
Payer: MEDICARE

## 2019-09-19 VITALS
SYSTOLIC BLOOD PRESSURE: 123 MMHG | HEIGHT: 67 IN | BODY MASS INDEX: 29.51 KG/M2 | WEIGHT: 188 LBS | DIASTOLIC BLOOD PRESSURE: 84 MMHG | TEMPERATURE: 98.2 F | HEART RATE: 93 BPM | OXYGEN SATURATION: 99 % | RESPIRATION RATE: 20 BRPM

## 2019-09-19 PROCEDURE — 36415 COLL VENOUS BLD VENIPUNCTURE: CPT

## 2019-09-19 PROCEDURE — 99214 OFFICE O/P EST MOD 30 MIN: CPT | Mod: 25

## 2019-09-19 NOTE — HISTORY OF PRESENT ILLNESS
[FreeTextEntry1] : 33 year old female with Sjogren's, with Ro+, hypothyroid, leukopenia, also with history c/w fibromyalgia, irritable bowel syndrome (bloating) anxiety and multiple non-drug allergies, followed by pain management in past, neurology, ortho and GI. Primary issues have been of generalized pain. On long term prednisone with difficulty tapering, on 4mg/d since ~early 2015. 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 Tried pilocarpine for dryness, but unable to tolerate.\par \par --Received an epidural into lumbar spine 6/15/16with excellent relief of low back pain (which radiated to pelvis and legs). Previous pain physicians included Dr Case, but insurance no longer taken; had been followed by Dr Morel ([facility])--last appointment in July '16--told that MRI of c-spine were without change. Continues on lyrica and topamax; \par \par s/p fall in May'2016--w surgery (9/16) for ruptured flexor tendon right hand and ulnar nerve release at right elbow.\par \par 9/7/17 Feels that everything has been bad this summer with c/o fatigue, arthralgia, myalgia, chest pain, nausea, bloating, hair loss. No photosensitivity, rash, oral ulcers, joint swelling. Has been off lyrica since ~Nov 2016 (didn't have a neurologist) but not restarted as she is contemplating pregnancy. She often misses a dose of plaquenil (taking bid). Using flexeril. Continues on pred 4mg (unable to decrease to 3mg). Now followed by Dr Johnson (neurology)--mri of pituitary with a "finding"--question aneurism and she is to have follow-up with a mra\par \par 1/11/2018 Has lost 50 lbs since last April.  Has been seen by GI, had endoscopy and colonoscopy at [facility] and she will have results next week. She does have constipation/diarrhea/loss of appetite.  Amylase/lipase were normal  She was told of "inflammation".  Saw Dr Ray (endocrine) and told of thyroid nodules--no sonogram yet secondary to scheduling difficulties.  Continues on plaquenil 400mg at bedtime, continues on prednisone 4mg, ran out of flexeril last month and notes that she is "out of whack" with lower back pain, more joint pain in her mcp's, pip's, diip's, wrists, shoulders, ankles, hips (not in groin) spares knees; also w pain in calves.  Received an injection into her right SI joint in 12/7/17 by pain management. Saw optomitrist 11/28, and had multiple tests--told that everything was fine. Additionally taken off of lyrica as she was considering pregnancy.\par \par 4/12/18- ruptured eardrum on feb 19th, told of difficulties with balance (Mar 26) by ENT and is scheduled for testing April 30th  (Dr Elio Dupree); Also seeing a new neurologist--repeat MRI of pituitary gland reportedly showed nothing.  Placed back on lyrica last Friday. No additional weight loss, and she has a repeat GI appointment; saw optho for plaquenil toxicity this week--given a script for dry eyes but not covered by insurance--using tears and pazao (anti-histamine drops).  No fevers, +night sweats, feels that her legs "are on fire". Started [place] program last week.\par \par 7/12/18--f/uliving in a shelter with her son, no kitchen facilities.  Meals are prepared for her and she has had difficulties tolerating the food and notes skin rashes, stiffness and feeling swollen.  Fell off the [employer] program; has seen an allergist and was given a ketoconizole shampoo and referred to dermatoloogy (but no appointments until November).  Also seen by a voice specialist and told by a muscle tension; receiving balance therapy (after ruptured eardrup).  No fever, anorexia, weight loss, alopecia, +fatigue and generalized pain. \par \par 10/18/18 f/u fibromyalgia, sjogren's syndrome--continuing with vestibular (balance) therapy--told that her eyes may be causing her dizzyness.  To review: dizziness  increased after rupture of right eardrum (secondary to a sinus infection in February).  Major complaint is of fatigue (under stress with social situation), Also with pain in shoulders, low back, hips, legs.notes pruritiis of her legs since August-hasn't changed detergent, but she scratches to point of bruising--saw endocrine 2 days ago and labs checked (nl lft's and bili); she has gained 30lbs since March. Continues on plaquenil 400mg, prednisone 4mg.  Saw neurology on Monday (and will be getting a f/u MRI/MRA for f/u pituitary).  Now taking flexeril at bedtime, also with increase in lyrica.  TSH checked by endocrine 2 days ago (low)\par \par 1/31/19  f/u SS, fibromyalgia--followed by neurology/pain, last seen in November (missed appointment this month secondary to her son's illness).  Major complaint is of fatigue--she is not sleeping well, using trazadone (but not during the weekdays); saw a new psych 12/4/18.  Doesn't remember the last time she was at pain management.  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  Pain continues (lower back, legs, shoulders, neck) and is worsened by rest and if she thinks about it, better with heat; additionally notes swelling of fingers. Was able to decrease  prednisone to 4 mg alternating with 3mg without noticing a change. (is taking prednisone at night). Bunions (bilateral) painful (but has been told that she cannot have surgery because of Raynaud's.  Notes alopecia (feels her hair is thin), +Raynaud's, no fever, weight loss,lymphadenopathy, \par 5/16/19  f/u SS, fibromyalgia--   ran out of lyrica (pain in lower back is increasing) for ~1 month and hasn't been back to neurologist; she is s/p a fall taking her son to school on 4/23/19, didn't go to ER or urgent care nor has she made an appointment with her neurologist ([facility] [place]); has additionally not returned to her pcp.  She continues without change in her multiple complaints/symptoms--continues with fatigue and continues to not sleep well (using trazadone (but not during the weekdays); Generalized pain continues in lower back, legs, shoulders, neck) and is worsened by rest and if she thinks about it, better with heat; (and she has not returned to pain management).  She has been able to tolerated the prednisone 4 mg alternating with 3mg.  Social issues unchanged (has been in shetlers and has moved 3 x over the past year).   Bunions (bilateral) painful (but has been told that she cannot have surgery because of Raynaud's.  States that her hair is thin, +Raynaud's, no fever, weight loss,lymphadenopathy, \par \par Sept 19, 2019\par States that she has been off lyrica (never restarted) and tapered off topamax in mid-July.  She states that she has been feeling well.  Continues on Pred 4/3mg (4mg on Tues and Thur only), plaquenil, Her main complaints are of right upper extremity pain in trapezius which radiates to neck, and down her hands.  Of note she "dislocated her knee" on Memorial Day weekend.  To ER the next day, cane given and an MRI obtained.  Continues to note fatigue (she wakes up frequently), but hair is growing, no Raynaud's, joint pain/swelling/stiffness, anorexia, ulcers, swollen lymph glands, unexplained fevers, rash.  Podiatry injected both bunions with substantial relief. She saw opthalmology, but didn't return for plaquenil toxicity check.  She additionally did not take the weekly vit D2, but is taking otc vit D3 2000IU daily.\par Meds: Prednisone 4mg/3mg, Plaquenil 400mg (last vft in 4/9/18), vit D3 200IU/d xyzal/Allegra 180, metamucil prn, synthroid 175,omprazole, cyclobenzprine (10mg q hs), off: , diclofenac 75 prn, naprosyn 500mg, tylenol 3 prn, TENS machine remains in storage), off \par \par PE: 188 lbs 123/84\par No rash, no alopecia, hair is growing, no oral or nasal ulcers, livedo, nodules, or lymphadenopathy. No synovitis/joint tenderness. General exam per emr.  wnl-\par \par Imp\par Sjogrens with fibromyalgia off lyrica, topamax and continues on flexeril and low dose prednisone.  No evidence of active inflammation by history or on exam.   Will continue plaquenil, will continue to decrease pred (3mg if tolerated)  Patient to f/u with Dr Santa iKng, neurology, podiatyr.. Will check labs f/u 3-4 months. Will check vit D level at next visit\par Patient to make  appointment for plaquenil check\par HM-Pt has received flu and pneumonia shot last year

## 2019-09-19 NOTE — PHYSICAL EXAM
[General Appearance - Alert] : alert [General Appearance - Well Nourished] : well nourished [General Appearance - Well Developed] : well developed [Sclera] : the sclera and conjunctiva were normal [Outer Ear] : the ears and nose were normal in appearance [Auscultation Breath Sounds / Voice Sounds] : lungs were clear to auscultation bilaterally [Heart Sounds] : normal S1 and S2 [Neck Appearance] : the appearance of the neck was normal [Murmurs] : no murmurs [Heart Sounds Pericardial Friction Rub] : no pericardial rub [Cervical Lymph Nodes Enlarged Posterior Bilaterally] : posterior cervical [Bowel Sounds] : normal bowel sounds [Supraclavicular Lymph Nodes Enlarged Bilaterally] : supraclavicular [Cervical Lymph Nodes Enlarged Anterior Bilaterally] : anterior cervical [Nail Clubbing] : no clubbing  or cyanosis of the fingernails [Abnormal Walk] : normal gait [] : no rash [Skin Lesions] : no skin lesions [FreeTextEntry1] : see hpi

## 2019-09-24 ENCOUNTER — APPOINTMENT (OUTPATIENT)
Dept: INTERNAL MEDICINE | Facility: CLINIC | Age: 34
End: 2019-09-24

## 2019-10-03 ENCOUNTER — APPOINTMENT (OUTPATIENT)
Dept: PHYSICAL MEDICINE AND REHAB | Facility: CLINIC | Age: 34
End: 2019-10-03
Payer: MEDICARE

## 2019-10-03 VITALS
RESPIRATION RATE: 16 BRPM | OXYGEN SATURATION: 100 % | WEIGHT: 188 LBS | BODY MASS INDEX: 29.51 KG/M2 | HEART RATE: 100 BPM | HEIGHT: 67 IN

## 2019-10-03 DIAGNOSIS — Z86.39 PERSONAL HISTORY OF OTHER ENDOCRINE, NUTRITIONAL AND METABOLIC DISEASE: ICD-10-CM

## 2019-10-03 DIAGNOSIS — Z86.69 PERSONAL HISTORY OF OTHER DISEASES OF THE NERVOUS SYSTEM AND SENSE ORGANS: ICD-10-CM

## 2019-10-03 DIAGNOSIS — M32.9 SYSTEMIC LUPUS ERYTHEMATOSUS, UNSPECIFIED: ICD-10-CM

## 2019-10-03 DIAGNOSIS — Z87.09 PERSONAL HISTORY OF OTHER DISEASES OF THE RESPIRATORY SYSTEM: ICD-10-CM

## 2019-10-03 DIAGNOSIS — M62.838 OTHER MUSCLE SPASM: ICD-10-CM

## 2019-10-03 PROCEDURE — 99204 OFFICE O/P NEW MOD 45 MIN: CPT

## 2019-10-15 ENCOUNTER — APPOINTMENT (OUTPATIENT)
Dept: INTERNAL MEDICINE | Facility: CLINIC | Age: 34
End: 2019-10-15

## 2019-10-27 LAB
ALBUMIN SERPL ELPH-MCNC: 4.4 G/DL
ALP BLD-CCNC: 67 U/L
ALT SERPL-CCNC: 15 U/L
ANA PAT FLD IF-IMP: ABNORMAL
ANA PATTERN: ABNORMAL
ANA SER IF-ACNC: ABNORMAL
ANA TITER: ABNORMAL
ANION GAP SERPL CALC-SCNC: 13 MMOL/L
APPEARANCE: CLEAR
AST SERPL-CCNC: 15 U/L
BASOPHILS # BLD AUTO: 0.01 K/UL
BASOPHILS NFR BLD AUTO: 0.3 %
BILIRUB SERPL-MCNC: 0.2 MG/DL
BILIRUBIN URINE: NEGATIVE
BLOOD URINE: NEGATIVE
BUN SERPL-MCNC: 11 MG/DL
C3 SERPL-MCNC: 128 MG/DL
C4 SERPL-MCNC: 31 MG/DL
CALCIUM SERPL-MCNC: 9.9 MG/DL
CHLORIDE SERPL-SCNC: 103 MMOL/L
CO2 SERPL-SCNC: 25 MMOL/L
COLOR: NORMAL
CREAT SERPL-MCNC: 0.71 MG/DL
ENA SS-A AB SER IA-ACNC: >8 AL
ENA SS-B AB SER IA-ACNC: <0.2 AL
EOSINOPHIL # BLD AUTO: 0.04 K/UL
EOSINOPHIL NFR BLD AUTO: 1.3 %
ERYTHROCYTE [SEDIMENTATION RATE] IN BLOOD BY WESTERGREN METHOD: 13 MM/HR
GLUCOSE QUALITATIVE U: NEGATIVE
GLUCOSE SERPL-MCNC: 92 MG/DL
HCT VFR BLD CALC: 41.5 %
HGB BLD-MCNC: 12.4 G/DL
IMM GRANULOCYTES NFR BLD AUTO: 0 %
KETONES URINE: NEGATIVE
LEUKOCYTE ESTERASE URINE: NEGATIVE
LYMPHOCYTES # BLD AUTO: 1.33 K/UL
LYMPHOCYTES NFR BLD AUTO: 42.4 %
MAN DIFF?: NORMAL
MCHC RBC-ENTMCNC: 25.5 PG
MCHC RBC-ENTMCNC: 29.9 GM/DL
MCV RBC AUTO: 85.4 FL
MONOCYTES # BLD AUTO: 0.25 K/UL
MONOCYTES NFR BLD AUTO: 8 %
NEUTROPHILS # BLD AUTO: 1.51 K/UL
NEUTROPHILS NFR BLD AUTO: 48 %
NITRITE URINE: NEGATIVE
PH URINE: 6.5
PLATELET # BLD AUTO: 295 K/UL
POTASSIUM SERPL-SCNC: 5.1 MMOL/L
PROT SERPL-MCNC: 7.4 G/DL
PROTEIN URINE: NEGATIVE
RBC # BLD: 4.86 M/UL
RBC # FLD: 13.7 %
RHEUMATOID FACT SER QL: <10 IU/ML
SODIUM SERPL-SCNC: 141 MMOL/L
SPECIFIC GRAVITY URINE: 1.01
UROBILINOGEN URINE: NORMAL
WBC # FLD AUTO: 3.14 K/UL

## 2019-11-06 ENCOUNTER — RX RENEWAL (OUTPATIENT)
Age: 34
End: 2019-11-06

## 2019-11-19 ENCOUNTER — APPOINTMENT (OUTPATIENT)
Dept: INTERNAL MEDICINE | Facility: CLINIC | Age: 34
End: 2019-11-19

## 2019-11-26 ENCOUNTER — APPOINTMENT (OUTPATIENT)
Dept: INTERNAL MEDICINE | Facility: CLINIC | Age: 34
End: 2019-11-26

## 2019-12-04 ENCOUNTER — OTHER (OUTPATIENT)
Age: 34
End: 2019-12-04

## 2020-01-06 ENCOUNTER — APPOINTMENT (OUTPATIENT)
Dept: ENDOCRINOLOGY | Facility: CLINIC | Age: 35
End: 2020-01-06
Payer: MEDICARE

## 2020-01-06 VITALS
DIASTOLIC BLOOD PRESSURE: 83 MMHG | WEIGHT: 202 LBS | SYSTOLIC BLOOD PRESSURE: 123 MMHG | BODY MASS INDEX: 31.64 KG/M2 | HEART RATE: 77 BPM

## 2020-01-06 PROCEDURE — 99215 OFFICE O/P EST HI 40 MIN: CPT

## 2020-01-07 NOTE — ASSESSMENT
[Importance of Diet and Exercise] : importance of diet and exercise to improve glycemic control, achieve weight loss and improve cardiovascular health [FreeTextEntry1] : 33 y/o F pt with:\par \par 1. Hx of Hypothyroidism:\par Pt appears to be clinically euthyroid, will send for thyroid ab, thyroid US and TFTs.\par Continue lt4 175 mcg qd\par \par 2. Hx of pre- DM (with A1c 5.7% in 2016):\par Pt is obese with recent BMI of 31.7 and her mother was recently diagnosed with DM. \par Will assess pt's risk for developing DM and her current pre- DM status. \par Will send for metabolic profile and A1c.\par \par 3. Hx of abnormal pituitary MRI:\par On 3/7/17, there is 1 x 1 mm round focus along the superior aspect of the pituitary gland immediately to the L of midline abutting the infundibulum which remains hypointense on post contrast imaging without mass effect. Pt has normal menses. \par Will send for prolactin levels today. \par \par 4. R thyroid gland enlargement?\par Pt is clinically euthyroid, will send for thyroid US. \par \par Return in 2 weeks.  [Levothyroxine] : The patient was instructed to take Levothyroxine on an empty stomach, separate from vitamins, and wait at least 30 minutes before eating

## 2020-01-07 NOTE — ADDENDUM
[FreeTextEntry1] : I, Ade Clemons, acted solely as a scribe for Dr. Christopher Ray on this date. 01/06/2020.

## 2020-01-07 NOTE — REVIEW OF SYSTEMS
[Recent Weight Gain (___ Lbs)] : recent [unfilled] ~Ulb weight gain [Nausea] : nausea [Headache] : headaches [As Noted in HPI] : as noted in HPI [Negative] : Musculoskeletal [Irregular Menses] : regular menses [FreeTextEntry4] : occasional difficulties swallowing

## 2020-01-07 NOTE — END OF VISIT
[FreeTextEntry3] : All medical record entries made by the Scribe were at my, Dr. Christopher Ray, direction and personally dictated by me on 01/06/2020. I have reviewed the chart and agree that the record accurately reflects my personal performance of the history, physical exam, assessment and plan. I have also personally directed, reviewed and agreed with the chart.  [>50% of Time Spent on Counseling for ____] : Greater than 50% of the encounter time was spent on counseling for [unfilled] [Time Spent: ___ minutes] : I have spent [unfilled] minutes of face to face time with the patient

## 2020-01-07 NOTE — PHYSICAL EXAM
[Alert] : alert [Normal Sclera/Conjunctiva] : normal sclera/conjunctiva [No Respiratory Distress] : no respiratory distress [No Accessory Muscle Use] : no accessory muscle use [Normal S1, S2] : normal S1 and S2 [Clear to Auscultation] : lungs were clear to auscultation bilaterally [Normal Rate] : heart rate was normal  [Pedal Pulses Normal] : the pedal pulses are present [No Edema] : there was no peripheral edema [Regular Rhythm] : with a regular rhythm [Normal Bowel Sounds] : normal bowel sounds [Spine Straight] : spine straight [No Stigmata of Cushings Syndrome] : no stigmata of cushings syndrome [Normal Gait] : normal gait [No Rash] : no rash [Normal Reflexes] : deep tendon reflexes were 2+ and symmetric [Oriented x3] : oriented to person, place, and time [No Tremors] : no tremors [Normal Outer Ear/Nose] : the ears and nose were normal in appearance [Acanthosis Nigricans] : no acanthosis nigricans [de-identified] : R lobe palpated

## 2020-01-07 NOTE — HISTORY OF PRESENT ILLNESS
[FreeTextEntry1] : 3/7/17 Pituitary MRI: there is 1 x 1 mm round focus along the superior aspect of the pituitary gland immediately to the L of midline abutting the infundibulum which remains hypointense on post contrast imaging without mass effect. \par 04/2018 thyroid US shows no nodules. \par 10/2018: TSH 0.08. 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 She notes she is on IUD and had her period. \par Denies any hospitalizations in the past 4 months, irregular menses, breast discharge, and plans for pregnancies.\par \par Current Medications: Plaquenil, Levothyroxine 175, Prednisone 3mg QD, Trileptal, Loratadine

## 2020-01-13 ENCOUNTER — APPOINTMENT (OUTPATIENT)
Dept: ENDOCRINOLOGY | Facility: CLINIC | Age: 35
End: 2020-01-13
Payer: MEDICARE

## 2020-01-13 VITALS — DIASTOLIC BLOOD PRESSURE: 85 MMHG | HEART RATE: 73 BPM | SYSTOLIC BLOOD PRESSURE: 131 MMHG

## 2020-01-13 PROCEDURE — 99214 OFFICE O/P EST MOD 30 MIN: CPT

## 2020-01-15 NOTE — END OF VISIT
[FreeTextEntry3] : All medical record entries made by the Scribe were at my, Dr. Christopher Ray, direction and personally dictated by me on 01/13/2020. I have reviewed the chart and agree that the record accurately reflects my personal performance of the history, physical exam, assessment and plan. I have also personally directed, reviewed and agreed with the chart.  [>50% of Time Spent on Counseling for ____] : Greater than 50% of the encounter time was spent on counseling for [unfilled] [Time Spent: ___ minutes] : I have spent [unfilled] minutes of face to face time with the patient

## 2020-01-15 NOTE — PHYSICAL EXAM
[Alert] : alert [Normal Sclera/Conjunctiva] : normal sclera/conjunctiva [Normal Outer Ear/Nose] : the ears and nose were normal in appearance [No Accessory Muscle Use] : no accessory muscle use [No Respiratory Distress] : no respiratory distress [Clear to Auscultation] : lungs were clear to auscultation bilaterally [Normal Rate] : heart rate was normal  [Normal S1, S2] : normal S1 and S2 [Regular Rhythm] : with a regular rhythm [Pedal Pulses Normal] : the pedal pulses are present [No Edema] : there was no peripheral edema [Normal Bowel Sounds] : normal bowel sounds [Spine Straight] : spine straight [No Stigmata of Cushings Syndrome] : no stigmata of cushings syndrome [Normal Gait] : normal gait [No Rash] : no rash [Normal Reflexes] : deep tendon reflexes were 2+ and symmetric [No Tremors] : no tremors [Oriented x3] : oriented to person, place, and time [de-identified] : R lobe palpated  [Acanthosis Nigricans] : no acanthosis nigricans

## 2020-01-15 NOTE — HISTORY OF PRESENT ILLNESS
[FreeTextEntry1] : 3/7/17 Pituitary MRI: there is 1 x 1 mm round focus along the superior aspect of the pituitary gland immediately to the L of midline abutting the infundibulum which remains hypointense on post contrast imaging without mass effect. \par 04/2018 thyroid US shows no nodules. \par 10/2018: TSH 0.08. \par 1/31/19 TSH 0.16, Total T4 7.9\par 1/6/20 A1c 5.8%, TSH 0.73, TPO ab 32.0, Thyroglobulin ab 178.0, Free T4 1.2,  Prolactin 13.5, s. creat 0.76, \par 1/7/20 Thyroid US: Impression: Small and heterogenous echogenicity thyroid gland without discrete nodule or cyst. 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 She notes she is on IUD and had her period. \par Denies any hospitalizations in the past 4 months, irregular menses, breast discharge, and plans for pregnancies.\par \par 1/13/20\par Today pt presents for endocrine f/u, feeling well. Pt reports her insurance does not want to cover the brand name of LT4 and that she has been on Synthroid since age 8.\par \par Current Medications: Plaquenil, Synthroid 175mcg, Prednisone 3mg QD, Trileptal, Loratadine

## 2020-01-15 NOTE — ASSESSMENT
[Importance of Diet and Exercise] : importance of diet and exercise to improve glycemic control, achieve weight loss and improve cardiovascular health [Levothyroxine] : The patient was instructed to take Levothyroxine on an empty stomach, separate from vitamins, and wait at least 30 minutes before eating [FreeTextEntry1] : 33 y/o F pt with:\par \par 1. Hx of Hypothyroidism, with normal TFTs on 1/6/19:\par Pt's thyroglobulin ab increased to 178. Pt appears to be clinically euthyroid. Recommend pt continue LT4 175mcg. Will order TFTs in 2/2020.\par \par 2. Hx of pre- DM:\par Pt's A1c was 5.7% in 2015 and recently it was 5.8%. Pt's weight has increased by 12lbs over the past 6 months. Re-explained the importance of lifestyle/diet modification to pt, who agreed to start it.\par \par 3. Hx of R thyroid gland enlargement?\par Thyroid US from 1/7/20 shows no thyroid nodules, instead glands correspond to pt with thyroiditis. \par \par Return in 4 months.

## 2020-01-15 NOTE — ADDENDUM
[FreeTextEntry1] : I, Ade Clemons, acted solely as a scribe for Dr. Christopher Ray on this date. 01/13/2020.

## 2020-01-15 NOTE — REVIEW OF SYSTEMS
[Recent Weight Gain (___ Lbs)] : recent [unfilled] ~Ulb weight gain [Nausea] : nausea [As Noted in HPI] : as noted in HPI [Headache] : headaches [Negative] : Psychiatric [Irregular Menses] : regular menses [FreeTextEntry4] : occasional difficulties swallowing

## 2020-01-16 ENCOUNTER — APPOINTMENT (OUTPATIENT)
Dept: RHEUMATOLOGY | Facility: CLINIC | Age: 35
End: 2020-01-16

## 2020-02-04 LAB
ALBUMIN SERPL ELPH-MCNC: 4.4 G/DL
ALP BLD-CCNC: 72 U/L
ALT SERPL-CCNC: 15 U/L
ANION GAP SERPL CALC-SCNC: 14 MMOL/L
AST SERPL-CCNC: 19 U/L
BILIRUB SERPL-MCNC: <0.2 MG/DL
BUN SERPL-MCNC: 14 MG/DL
CALCIUM SERPL-MCNC: 9.7 MG/DL
CHLORIDE SERPL-SCNC: 104 MMOL/L
CO2 SERPL-SCNC: 24 MMOL/L
CREAT SERPL-MCNC: 0.76 MG/DL
ESTIMATED AVERAGE GLUCOSE: 120 MG/DL
GLUCOSE SERPL-MCNC: 102 MG/DL
HBA1C MFR BLD HPLC: 5.8 %
HCG SERPL QL: NEGATIVE
PAPP-A SERPL-ACNC: <1 MIU/ML
POTASSIUM SERPL-SCNC: 4.8 MMOL/L
PROLACTIN SERPL-MCNC: 13.5 NG/ML
PROT SERPL-MCNC: 7.4 G/DL
SODIUM SERPL-SCNC: 142 MMOL/L
T4 FREE SERPL-MCNC: 1.2 NG/DL
THYROGLOB AB SERPL-ACNC: 178 IU/ML
THYROPEROXIDASE AB SERPL IA-ACNC: 32 IU/ML
TSH SERPL-ACNC: 0.73 UIU/ML

## 2020-03-23 LAB
T4 FREE SERPL-MCNC: 0.5 NG/DL
TSH SERPL-ACNC: 7.17 UIU/ML

## 2020-05-18 ENCOUNTER — APPOINTMENT (OUTPATIENT)
Dept: ENDOCRINOLOGY | Facility: CLINIC | Age: 35
End: 2020-05-18

## 2020-06-01 ENCOUNTER — APPOINTMENT (OUTPATIENT)
Dept: ENDOCRINOLOGY | Facility: CLINIC | Age: 35
End: 2020-06-01

## 2020-08-02 ENCOUNTER — TRANSCRIPTION ENCOUNTER (OUTPATIENT)
Age: 35
End: 2020-08-02

## 2020-08-13 ENCOUNTER — APPOINTMENT (OUTPATIENT)
Dept: RHEUMATOLOGY | Facility: CLINIC | Age: 35
End: 2020-08-13

## 2020-08-13 ENCOUNTER — TRANSCRIPTION ENCOUNTER (OUTPATIENT)
Age: 35
End: 2020-08-13

## 2020-08-26 ENCOUNTER — LABORATORY RESULT (OUTPATIENT)
Age: 35
End: 2020-08-26

## 2020-08-27 ENCOUNTER — APPOINTMENT (OUTPATIENT)
Dept: RHEUMATOLOGY | Facility: CLINIC | Age: 35
End: 2020-08-27
Payer: MEDICARE

## 2020-08-27 VITALS
DIASTOLIC BLOOD PRESSURE: 99 MMHG | TEMPERATURE: 98 F | SYSTOLIC BLOOD PRESSURE: 142 MMHG | HEIGHT: 67 IN | HEART RATE: 85 BPM | BODY MASS INDEX: 34.34 KG/M2 | WEIGHT: 218.8 LBS | OXYGEN SATURATION: 100 %

## 2020-08-27 PROCEDURE — 99214 OFFICE O/P EST MOD 30 MIN: CPT

## 2020-08-27 NOTE — PHYSICAL EXAM
[General Appearance - Alert] : alert [General Appearance - Well Developed] : well developed [General Appearance - Well Nourished] : well nourished [Outer Ear] : the ears and nose were normal in appearance [Sclera] : the sclera and conjunctiva were normal [Neck Appearance] : the appearance of the neck was normal [Heart Sounds] : normal S1 and S2 [Auscultation Breath Sounds / Voice Sounds] : lungs were clear to auscultation bilaterally [Murmurs] : no murmurs [Heart Sounds Pericardial Friction Rub] : no pericardial rub [Bowel Sounds] : normal bowel sounds [Cervical Lymph Nodes Enlarged Anterior Bilaterally] : anterior cervical [Cervical Lymph Nodes Enlarged Posterior Bilaterally] : posterior cervical [Supraclavicular Lymph Nodes Enlarged Bilaterally] : supraclavicular [Abnormal Walk] : normal gait [Nail Clubbing] : no clubbing  or cyanosis of the fingernails [] : no rash [Skin Lesions] : no skin lesions [Skin Color & Pigmentation] : normal skin color and pigmentation [FreeTextEntry1] : see jpi

## 2020-08-27 NOTE — HISTORY OF PRESENT ILLNESS
[FreeTextEntry1] : 34 year old female with Sjogren's, with Ro+, hypothyroid, leukopenia, also with history c/w fibromyalgia, irritable bowel syndrome (bloating) anxiety and multiple non-drug allergies, followed by pain management in past, neurology, ortho and GI. Primary issues have been of generalized pain. On long term prednisone with difficulty tapering, on 4mg/d since ~early 2015. 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 Tried pilocarpine for dryness, but unable to tolerate.\par \par --Received an epidural into lumbar spine 6/15/16with excellent relief of low back pain (which radiated to pelvis and legs). Previous pain physicians included Dr Case, but insurance no longer taken; had been followed by Dr Morel ([facility])--last appointment in July '16--told that MRI of c-spine were without change. Continues on lyrica and topamax; \par \par s/p fall in May'2016--w surgery (9/16) for ruptured flexor tendon right hand and ulnar nerve release at right elbow.\par \par 9/7/17 Feels that everything has been bad this summer with c/o fatigue, arthralgia, myalgia, chest pain, nausea, bloating, hair loss. No photosensitivity, rash, oral ulcers, joint swelling. Has been off lyrica since ~Nov 2016 (didn't have a neurologist) but not restarted as she is contemplating pregnancy. She often misses a dose of plaquenil (taking bid). Using flexeril. Continues on pred 4mg (unable to decrease to 3mg). Now followed by Dr Johnson (neurology)--mri of pituitary with a "finding"--question aneurism and she is to have follow-up with a mra\par \par 1/11/2018 Has lost 50 lbs since last April.  Has been seen by GI, had endoscopy and colonoscopy at [facility] [place]note 2[facility] and she will have results next week. She does have constipation/diarrhea/loss of appetite.  Amylase/lipase were normal  She was told of "inflammation".  Saw Dr Ray (endocrine) and told of thyroid nodules--no sonogram yet secondary to scheduling difficulties.  Continues on plaquenil 400mg at bedtime, continues on prednisone 4mg, ran out of flexeril last month and notes that she is "out of whack" with lower back pain, more joint pain in her mcp's, pip's, diip's, wrists, shoulders, ankles, hips (not in groin) spares knees; also w pain in calves.  Received an injection into her right SI joint in 12/7/17 by pain management. Saw optomitrist 11/28, and had multiple tests--told that everything was fine. Additionally taken off of lyrica as she was considering pregnancy.\par \par 4/12/18- ruptured eardrum on feb 19th, told of difficulties with balance (Mar 26) by ENT and is scheduled for testing April 30th  (Dr Elio Dupree); Also seeing a new neurologist--repeat MRI of pituitary gland reportedly showed nothing.  Placed back on lyrica last Friday. No additional weight loss, and she has a repeat GI appointment; saw optho for plaquenil toxicity this week--given a script for dry eyes but not covered by insurance--using tears and pazao (anti-histamine drops).  No fevers, +night sweats, feels that her legs "are on fire". Started [employer] program last week.\par \par 7/12/18--f/uliving in a shelter with her son, no kitchen facilities.  Meals are prepared for her and she has had difficulties tolerating the food and notes skin rashes, stiffness and feeling swollen.  Fell off the [employer] program; has seen an allergist and was given a ketoconizole shampoo and referred to dermatoloogy (but no appointments until November).  Also seen by a voice specialist and told by a muscle tension; receiving balance therapy (after ruptured eardrup).  No fever, anorexia, weight loss, alopecia, +fatigue and generalized pain. \par \par 10/18/18 f/u fibromyalgia, sjogren's syndrome--continuing with vestibular (balance) therapy--told that her eyes may be causing her dizzyness.  To review: dizziness  increased after rupture of right eardrum (secondary to a sinus infection in February).  Major complaint is of fatigue (under stress with social situation), Also with pain in shoulders, low back, hips, legs.notes pruritiis of her legs since August-hasn't changed detergent, but she scratches to point of bruising--saw endocrine 2 days ago and labs checked (nl lft's and bili); she has gained 30lbs since March. Continues on plaquenil 400mg, prednisone 4mg.  Saw neurology on Monday (and will be getting a f/u MRI/MRA for f/u pituitary).  Now taking flexeril at bedtime, also with increase in lyrica.  TSH checked by endocrine 2 days ago (low)\par \par 1/31/19  f/u SS, fibromyalgia--followed by neurology/pain, last seen in November (missed appointment this month secondary to her son's illness).  Major complaint is of fatigue--she is not sleeping well, using trazadone (but not during the weekdays); saw a new psych 12/4/18.  Doesn't remember the last time she was at pain management.  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  Pain continues (lower back, legs, shoulders, neck) and is worsened by rest and if she thinks about it, better with heat; additionally notes swelling of fingers. Was able to decrease  prednisone to 4 mg alternating with 3mg without noticing a change. (is taking prednisone at night). Bunions (bilateral) painful (but has been told that she cannot have surgery because of Raynaud's.  Notes alopecia (feels her hair is thin), +Raynaud's, no fever, weight loss,lymphadenopathy, \par 5/16/19  f/u SS, fibromyalgia--   ran out of lyrica (pain in lower back is increasing) for ~1 month and hasn't been back to neurologist; she is s/p a fall taking her son to school on 4/23/19, didn't go to ER or urgent care nor has she made an appointment with her neurologist ([facility]); has additionally not returned to her pcp.  She continues without change in her multiple complaints/symptoms--continues with fatigue and continues to not sleep well (using trazadone (but not during the weekdays); Generalized pain continues in lower back, legs, shoulders, neck) and is worsened by rest and if she thinks about it, better with heat; (and she has not returned to pain management).  She has been able to tolerated the prednisone 4 mg alternating with 3mg.  Social issues unchanged (has been in shetlers and has moved 3 x over the past year).   Bunions (bilateral) painful (but has been told that she cannot have surgery because of Raynaud's.  States that her hair is thin, +Raynaud's, no fever, weight loss,lymphadenopathy, \par \par Sept 19, 2019\par States that she has been off lyrica (never restarted) and tapered off topamax in mid-July.  She states that she has been feeling well.  Continues on Pred 4/3mg (4mg on Tues and Thur only), plaquenil, Her main complaints are of right upper extremity pain in trapezius which radiates to neck, and down her hands.  Of note she "dislocated her knee" on Memorial Day weekend.  To ER the next day, cane given and an MRI obtained.  Continues to note fatigue (she wakes up frequently), but hair is growing, no Raynaud's, joint pain/swelling/stiffness, anorexia, ulcers, swollen lymph glands, unexplained fevers, rash.  Podiatry injected both bunions with substantial relief. She saw opthalmology, but didn't return for plaquenil toxicity check.  She additionally did not take the weekly vit D2, but is taking otc vit D3 2000IU daily.\par \par August 27, 2020\par f/u Sjogren's/fibromyalgia, doing well on prednisone 3mg and plaquenil.  She has moved into an apartment last October (I haven't seen her since Sept).  Denies fatigue, alopecia, joint pain/swelling/stiffness, lymphadnopathy, fever, rash, anorexia, anosmia, loss of taste.  She had a covid test yesterday (her  uses the public transportation for his job).  Of note, she states that she has had right upper and lower extremity weakiness for 9 years. \par \par Meds: Prednisone 3mg, Plaquenil 400mg (last vft scheduled 9/4/20), vit D3 2000 bidI U/d xyzal/Allegra 180, metamucil prn, synthroid 175,omprazole, cyclobenzprine (10mg q hs), off: , diclofenac 75 prn, naprosyn 500mg, tylenol 3 prn, TENS machine remains in storage), off \par \par PE: 218 lbs 142/99\par No rash, no alopecia, hair is growing, no oral or nasal ulcers, livedo, nodules, or lymphadenopathy. No synovitis/joint tenderness. Motor 5-/5;  General exam per emr.  wnl-\par \par Imp\par Sjogrens with fibromyalgia off lyrica, topamax and continues on flexeril and low dose prednisone.  Continues without signs or symptoms of inflammation by history or on exam.   Will continue plaquenil, will decrease pred (3mg)  Careful neuro exam with suble right sided weakness, will check apL's and will refer to neurology;  Will check labs f/u 3-4 months. \par HM-Pt has received flu and pneumonia shot last year--continue covid precautions

## 2020-11-23 ENCOUNTER — TRANSCRIPTION ENCOUNTER (OUTPATIENT)
Age: 35
End: 2020-11-23

## 2020-11-24 ENCOUNTER — TRANSCRIPTION ENCOUNTER (OUTPATIENT)
Age: 35
End: 2020-11-24

## 2021-02-04 ENCOUNTER — APPOINTMENT (OUTPATIENT)
Dept: RHEUMATOLOGY | Facility: CLINIC | Age: 36
End: 2021-02-04

## 2021-03-09 ENCOUNTER — APPOINTMENT (OUTPATIENT)
Dept: ENDOCRINOLOGY | Facility: CLINIC | Age: 36
End: 2021-03-09
Payer: MEDICARE

## 2021-03-09 VITALS
DIASTOLIC BLOOD PRESSURE: 77 MMHG | WEIGHT: 207 LBS | HEIGHT: 67 IN | SYSTOLIC BLOOD PRESSURE: 119 MMHG | BODY MASS INDEX: 32.49 KG/M2 | HEART RATE: 73 BPM

## 2021-03-09 PROCEDURE — 99214 OFFICE O/P EST MOD 30 MIN: CPT | Mod: 25

## 2021-03-09 PROCEDURE — 99072 ADDL SUPL MATRL&STAF TM PHE: CPT

## 2021-03-11 NOTE — ASSESSMENT
[Importance of Diet and Exercise] : importance of diet and exercise to improve glycemic control, achieve weight loss and improve cardiovascular health [Levothyroxine] : The patient was instructed to take Levothyroxine on an empty stomach, separate from vitamins, and wait at least 30 minutes before eating [FreeTextEntry1] : 34 y/o F pt with:\par \par 1. Hypothyroidism (dx at age 8):\par Pt is on Prednisone for Rheumatological disorder. \par Pt appears to be clinically euthyroid, will order TFTs including Free T3 and contact her with results.\par \par 2. Hx of pre- DM:\par Pt's A1c was 5.6% in 2/2021 and her BMI is 34.27. Pt has been focused and she lost 11lbs. General principles of weight reduction explained, including importance of lifestyle/diet modification. There is no need for medications at this time.\par \par Return in 2-4 months.

## 2021-03-11 NOTE — HISTORY OF PRESENT ILLNESS
[FreeTextEntry1] : 3/7/17 Pituitary MRI: there is 1 x 1 mm round focus along the superior aspect of the pituitary gland immediately to the L of midline abutting the infundibulum which remains hypointense on post contrast imaging without mass effect. \par 04/2018 thyroid US shows no nodules. \par 10/2018: TSH 0.08. \par 1/31/19 TSH 0.16, Total T4 7.9\par 1/6/20 A1c 5.8%, TSH 0.73, TPO ab 32.0, Thyroglobulin ab 178.0, Free T4 1.2,  Prolactin 13.5, s. creat 0.76, \par 1/7/20 Thyroid US: Impression: Small and heterogenous echogenicity thyroid gland without discrete nodule or cyst. \par 2/26/20 TSH 7.17, Free T4 0.5\par 8/27/20 Ca 9.5, s. 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 Pt reports her insurance does not want to cover the brand name of LT4 and that she has been on Synthroid since age 8.\par \par 3/9/21\par Pt states she was dx with Hypothyroidism at age 8 and has been on Synthroid since then. \par \par Pt notes she started Prednisone in 2013 first as 5mg which decreased to 4mg. She notes she is currently taking 1mg 2 pills 4x7 and 1 pill 1x7. \par \par Endocrine note from 7/2014: Pt was on thyroid supplementations 137mcg on 3/2014. 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 A modified water deprivation test was performed, rule out DI. Pt was dx with Sjogren, SLE and RA." \par \par \par \par Today pt presents for endocrine f/u, feeling well. She notes she has Migraines and she took Topamax in the past for this. Pt is trying to lose weight and saw her PCP. She notes she has an appointment to see Rheumatologist. She does not have an appointment to see Neurologist. \par Denies irregular menses, having plans for pregnancy.\par \par Current Medications: Plaquenil 200 BID, Synthroid 175mcg, Prednisone QD (started in 2013), Trileptal, Loratadine

## 2021-03-11 NOTE — ADDENDUM
[FreeTextEntry1] : I, Ade Clemons, acted solely as a scribe for Dr. Christopher Ray on this date. 03/09/2021.

## 2021-03-11 NOTE — PHYSICAL EXAM
[Alert] : alert [Normal Sclera/Conjunctiva] : normal sclera/conjunctiva [Normal Outer Ear/Nose] : the ears and nose were normal in appearance [No Respiratory Distress] : no respiratory distress [Clear to Auscultation] : lungs were clear to auscultation bilaterally [Normal S1, S2] : normal S1 and S2 [Normal Rate] : heart rate was normal [Regular Rhythm] : with a regular rhythm [No Edema] : no peripheral edema [Pedal Pulses Normal] : the pedal pulses are present [Normal Bowel Sounds] : normal bowel sounds [Spine Straight] : spine straight [No Stigmata of Cushings Syndrome] : no stigmata of Cushings Syndrome [Normal Gait] : normal gait [No Tremors] : no tremors [Oriented x3] : oriented to person, place, and time [Acanthosis Nigricans] : no acanthosis nigricans [de-identified] : R lobe palpated

## 2021-03-11 NOTE — END OF VISIT
[FreeTextEntry3] : All medical record entries made by the Scribe were at my, Dr. Christopher Ray, direction and personally dictated by me on 03/09/2021. I have reviewed the chart and agree that the record accurately reflects my personal performance of the history, physical exam, assessment and plan. I have also personally directed, reviewed and agreed with the chart.  [Time Spent: ___ minutes] : I have spent [unfilled] minutes of time on the encounter.

## 2021-03-11 NOTE — REASON FOR VISIT
[Follow - Up] : a follow-up visit [Follow-Up: _____] : a [unfilled] follow-up visit [Hypothyroidism] : hypothyroidism [Other___] : [unfilled]

## 2021-03-13 LAB
25(OH)D3 SERPL-MCNC: 38 NG/ML
T3FREE SERPL-MCNC: 2.66 PG/ML
T4 FREE SERPL-MCNC: 1.4 NG/DL
THYROGLOB AB SERPL-ACNC: 100 IU/ML
THYROPEROXIDASE AB SERPL IA-ACNC: 19.8 IU/ML
TSH SERPL-ACNC: 0.03 UIU/ML

## 2021-03-18 ENCOUNTER — APPOINTMENT (OUTPATIENT)
Dept: RHEUMATOLOGY | Facility: CLINIC | Age: 36
End: 2021-03-18

## 2021-04-08 ENCOUNTER — APPOINTMENT (OUTPATIENT)
Dept: RHEUMATOLOGY | Facility: CLINIC | Age: 36
End: 2021-04-08
Payer: MEDICARE

## 2021-04-08 PROCEDURE — 99072 ADDL SUPL MATRL&STAF TM PHE: CPT

## 2021-04-08 PROCEDURE — 99214 OFFICE O/P EST MOD 30 MIN: CPT

## 2021-04-08 NOTE — HISTORY OF PRESENT ILLNESS
[FreeTextEntry1] : 35 year old female with Sjogren's, with Ro+, hypothyroid, leukopenia, also with history c/w fibromyalgia, irritable bowel syndrome (bloating) anxiety and multiple non-drug allergies, followed by pain management in past, neurology, ortho and GI. Primary issues have been of generalized pain. On long term prednisone with difficulty tapering, on 4mg/d since ~early 2015. 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 Tried pilocarpine for dryness, but unable to tolerate.\par \par --Received an epidural into lumbar spine 6/15/16with excellent relief of low back pain (which radiated to pelvis and legs). Previous pain physicians included Dr Case, but insurance no longer taken; had been followed by Dr Morel ([facility])--last appointment in July '16--told that MRI of c-spine were without change. Continues on lyrica and topamax; \par \par s/p fall in May'2016--w surgery (9/16) for ruptured flexor tendon right hand and ulnar nerve release at right elbow.\par \par 9/7/17 Feels that everything has been bad this summer with c/o fatigue, arthralgia, myalgia, chest pain, nausea, bloating, hair loss. No photosensitivity, rash, oral ulcers, joint swelling. Has been off lyrica since ~Nov 2016 (didn't have a neurologist) but not restarted as she is contemplating pregnancy. She often misses a dose of plaquenil (taking bid). Using flexeril. Continues on pred 4mg (unable to decrease to 3mg). Now followed by Dr Johnson (neurology)--mri of pituitary with a "finding"--question aneurism and she is to have follow-up with a mra\par \par 1/11/2018 Has lost 50 lbs since last April.  Has been seen by GI, had endoscopy and colonoscopy at [facility] and she will have results next week. She does have constipation/diarrhea/loss of appetite.  Amylase/lipase were normal  She was told of "inflammation".  Saw Dr Ray (endocrine) and told of thyroid nodules--no sonogram yet secondary to scheduling difficulties.  Continues on plaquenil 400mg at bedtime, continues on prednisone 4mg, ran out of flexeril last month and notes that she is "out of whack" with lower back pain, more joint pain in her mcp's, pip's, diip's, wrists, shoulders, ankles, hips (not in groin) spares knees; also w pain in calves.  Received an injection into her right SI joint in 12/7/17 by pain management. Saw optomitrist 11/28, and had multiple tests--told that everything was fine. Additionally taken off of lyrica as she was considering pregnancy.\par \par 4/12/18- ruptured eardrum on feb 19th, told of difficulties with balance (Mar 26) by ENT and is scheduled for testing April 30th  (Dr Elio Dupree); Also seeing a new neurologist--repeat MRI of pituitary gland reportedly showed nothing.  Placed back on lyrica last Friday. No additional weight loss, and she has a repeat GI appointment; saw optho for plaquenil toxicity this week--given a script for dry eyes but not covered by insurance--using tears and pazao (anti-histamine drops).  No fevers, +night sweats, feels that her legs "are on fire". Started [employer] program last week.\par \par 7/12/18--f/uliving in a shelter with her son, no kitchen facilities.  Meals are prepared for her and she has had difficulties tolerating the food and notes skin rashes, stiffness and feeling swollen.  Fell off the [employer] program; has seen an allergist and was given a ketoconizole shampoo and referred to dermatoloogy (but no appointments until November).  Also seen by a voice specialist and told by a muscle tension; receiving balance therapy (after ruptured eardrup).  No fever, anorexia, weight loss, alopecia, +fatigue and generalized pain. \par \par 10/18/18 f/u fibromyalgia, sjogren's syndrome--continuing with vestibular (balance) therapy--told that her eyes may be causing her dizzyness.  To review: dizziness  increased after rupture of right eardrum (secondary to a sinus infection in February).  Major complaint is of fatigue (under stress with social situation), Also with pain in shoulders, low back, hips, legs.notes pruritiis of her legs since August-hasn't changed detergent, but she scratches to point of bruising--saw endocrine 2 days ago and labs checked (nl lft's and bili); she has gained 30lbs since March. Continues on plaquenil 400mg, prednisone 4mg.  Saw neurology on Monday (and will be getting a f/u MRI/MRA for f/u pituitary).  Now taking flexeril at bedtime, also with increase in lyrica.  TSH checked by endocrine 2 days ago (low)\par \par 1/31/19  f/u SS, fibromyalgia--followed by neurology/pain, last seen in November (missed appointment this month secondary to her son's illness).  Major complaint is of fatigue--she is not sleeping well, using trazadone (but not during the weekdays); saw a new psych 12/4/18.  Doesn't remember the last time she was at pain management.  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  Pain continues (lower back, legs, shoulders, neck) and is worsened by rest and if she thinks about it, better with heat; additionally notes swelling of fingers. Was able to decrease  prednisone to 4 mg alternating with 3mg without noticing a change. (is taking prednisone at night). Bunions (bilateral) painful (but has been told that she cannot have surgery because of Raynaud's.  Notes alopecia (feels her hair is thin), +Raynaud's, no fever, weight loss,lymphadenopathy, \par 5/16/19  f/u SS, fibromyalgia--   ran out of lyrica (pain in lower back is increasing) for ~1 month and hasn't been back to neurologist; she is s/p a fall taking her son to school on 4/23/19, didn't go to ER or urgent care nor has she made an appointment with her neurologist ([facility]); has additionally not returned to her pcp.  She continues without change in her multiple complaints/symptoms--continues with fatigue and continues to not sleep well (using trazadone (but not during the weekdays); Generalized pain continues in lower back, legs, shoulders, neck) and is worsened by rest and if she thinks about it, better with heat; (and she has not returned to pain management).  She has been able to tolerated the prednisone 4 mg alternating with 3mg.  Social issues unchanged (has been in shetlers and has moved 3 x over the past year).   Bunions (bilateral) painful (but has been told that she cannot have surgery because of Raynaud's.  States that her hair is thin, +Raynaud's, no fever, weight loss,lymphadenopathy, \par \par Sept 19, 2019\par States that she has been off lyrica (never restarted) and tapered off topamax in mid-July.  She states that she has been feeling well.  Continues on Pred 4/3mg (4mg on Tues and Thur only), plaquenil, Her main complaints are of right upper extremity pain in trapezius which radiates to neck, and down her hands.  Of note she "dislocated her knee" on Memorial Day weekend.  To ER the next day, cane given and an MRI obtained.  Continues to note fatigue (she wakes up frequently), but hair is growing, no Raynaud's, joint pain/swelling/stiffness, anorexia, ulcers, swollen lymph glands, unexplained fevers, rash.  Podiatry injected both bunions with substantial relief. She saw opthalmology, but didn't return for plaquenil toxicity check.  She additionally did not take the weekly vit D2, but is taking otc vit D3 2000IU daily.\par \par August 27, 2020\par f/u Sjogren's/fibromyalgia, doing well on prednisone 3mg since December and plaquenil.  She has moved into an apartment last October (I haven't seen her since Sept).  Denies fatigue, alopecia, joint pain/swelling/stiffness, lymphadnopathy, fever, rash, anorexia, anosmia, loss of taste.  She had a covid test yesterday (her  uses the public transportation for his job).  Of note, she states that she has had right upper and lower extremity weakiness for 9 years. \par \par April 7, 2021\par /u Sjogren's/fibromyalgia, doing well on prednisone 2mg alt w 1mg since December. Occasional muscle pain behind breasts exacerbated by certain movements.  Awaiting a songram and mammogram. Has been losing weight (post COVID quarantine), but notes poor appetite since she increased to over 200lbs.  No fever, rash, anorexia, weight loss, alopecia, lymphadenopathy, chills, joint pain/swelling/stiffness, oral ulcers, fatigue, dry eyes, chest pain, shortness of breath, cough, anosmia, ageusia, nausea, vomiting, diarrhea, abdominal pain.  Is considering the COVID vaccine.\par \par \par Meds: Prednisone 2mg alt w 1mgmg, Plaquenil 400mg (last vft scheduled 9/4/20), vit D3 2000, zrytec xyzal/Allegra 180, metamucil prn, synthroid 175,  ), off: diclofenac 75 prn, naprosyn 500mg, tylenol 3 prn, cyclobenzprine (10mg q hs\par \par PE: 207 lbs 119/77\par No rash, no alopecia, hair is growing, no oral or nasal ulcers, livedo, nodules, or lymphadenopathy. No synovitis/joint tenderness. Lungs: Clear without wheeze, rales, rhonch; Cor: S1S2 without murmur, gallop or rub; Abd: soft, NT, BS+;, Back: no spinal or paraspinal tenderness; Ext: no CCE; Neuro: non-focal\par \par \par Imp\par Sjogrens with fibromyalgia  on low dose prednisone, (previously on lyrica, topamax) has also not been taking flexeril and .  Continues without signs or symptoms of inflammation by history or on exam.   Will continue plaquenil, will decrease pred to 1mg/d  \par HM-Pt has received flu and pneumonia shot last year--continue covid precautions.\par COVID--Patient to continue to social distance, to wear masks and to follow recommendations for hand hygiene.  Risks and benefits of vaccine discussed including unknowns.  Jose G is considering the vaccine.\par

## 2021-04-08 NOTE — PHYSICAL EXAM
[General Appearance - Alert] : alert [General Appearance - Well Nourished] : well nourished [General Appearance - Well Developed] : well developed [Sclera] : the sclera and conjunctiva were normal [Outer Ear] : the ears and nose were normal in appearance [Neck Appearance] : the appearance of the neck was normal [Auscultation Breath Sounds / Voice Sounds] : lungs were clear to auscultation bilaterally [Heart Sounds] : normal S1 and S2 [Murmurs] : no murmurs [Heart Sounds Pericardial Friction Rub] : no pericardial rub [Bowel Sounds] : normal bowel sounds [Cervical Lymph Nodes Enlarged Posterior Bilaterally] : posterior cervical [Cervical Lymph Nodes Enlarged Anterior Bilaterally] : anterior cervical [Supraclavicular Lymph Nodes Enlarged Bilaterally] : supraclavicular [Abnormal Walk] : normal gait [Nail Clubbing] : no clubbing  or cyanosis of the fingernails [Skin Color & Pigmentation] : normal skin color and pigmentation [] : no rash [Skin Lesions] : no skin lesions [FreeTextEntry1] : see jpi

## 2021-04-08 NOTE — ASSESSMENT
[FreeTextEntry1] : See Assessment within History of Present Illness\par \par 30 minutes spent with this encounter including face to face time, chart review (labs/notes) and charting

## 2021-04-12 LAB
ALBUMIN SERPL ELPH-MCNC: 4.1 G/DL
ALP BLD-CCNC: 73 U/L
ALT SERPL-CCNC: 16 U/L
ANION GAP SERPL CALC-SCNC: 9 MMOL/L
AST SERPL-CCNC: 18 U/L
BASOPHILS # BLD AUTO: 0.02 K/UL
BASOPHILS NFR BLD AUTO: 0.6 %
BILIRUB SERPL-MCNC: 0.3 MG/DL
BUN SERPL-MCNC: 10 MG/DL
C3 SERPL-MCNC: 121 MG/DL
C4 SERPL-MCNC: 31 MG/DL
CALCIUM SERPL-MCNC: 9.1 MG/DL
CHLORIDE SERPL-SCNC: 108 MMOL/L
CO2 SERPL-SCNC: 23 MMOL/L
CREAT SERPL-MCNC: 0.69 MG/DL
EOSINOPHIL # BLD AUTO: 0.1 K/UL
EOSINOPHIL NFR BLD AUTO: 3.1 %
ERYTHROCYTE [SEDIMENTATION RATE] IN BLOOD BY WESTERGREN METHOD: 15 MM/HR
GLUCOSE SERPL-MCNC: 96 MG/DL
HCT VFR BLD CALC: 42.6 %
HGB BLD-MCNC: 13.3 G/DL
IMM GRANULOCYTES NFR BLD AUTO: 0 %
LYMPHOCYTES # BLD AUTO: 1.38 K/UL
LYMPHOCYTES NFR BLD AUTO: 42.6 %
MAN DIFF?: NORMAL
MCHC RBC-ENTMCNC: 27.4 PG
MCHC RBC-ENTMCNC: 31.2 GM/DL
MCV RBC AUTO: 87.7 FL
MONOCYTES # BLD AUTO: 0.3 K/UL
MONOCYTES NFR BLD AUTO: 9.3 %
NEUTROPHILS # BLD AUTO: 1.44 K/UL
NEUTROPHILS NFR BLD AUTO: 44.4 %
PLATELET # BLD AUTO: 274 K/UL
POTASSIUM SERPL-SCNC: 4.5 MMOL/L
PROT SERPL-MCNC: 7.2 G/DL
RBC # BLD: 4.86 M/UL
RBC # FLD: 13.1 %
SODIUM SERPL-SCNC: 139 MMOL/L
WBC # FLD AUTO: 3.24 K/UL

## 2021-04-18 ENCOUNTER — TRANSCRIPTION ENCOUNTER (OUTPATIENT)
Age: 36
End: 2021-04-18

## 2021-05-06 ENCOUNTER — APPOINTMENT (OUTPATIENT)
Dept: INFECTIOUS DISEASE | Facility: CLINIC | Age: 36
End: 2021-05-06

## 2021-08-12 ENCOUNTER — RX RENEWAL (OUTPATIENT)
Age: 36
End: 2021-08-12

## 2021-09-10 ENCOUNTER — APPOINTMENT (OUTPATIENT)
Dept: ENDOCRINOLOGY | Facility: CLINIC | Age: 36
End: 2021-09-10

## 2021-09-13 ENCOUNTER — TRANSCRIPTION ENCOUNTER (OUTPATIENT)
Age: 36
End: 2021-09-13

## 2021-10-14 ENCOUNTER — LABORATORY RESULT (OUTPATIENT)
Age: 36
End: 2021-10-14

## 2021-10-14 ENCOUNTER — APPOINTMENT (OUTPATIENT)
Dept: RHEUMATOLOGY | Facility: CLINIC | Age: 36
End: 2021-10-14
Payer: MEDICARE

## 2021-10-14 VITALS
HEART RATE: 80 BPM | TEMPERATURE: 98 F | SYSTOLIC BLOOD PRESSURE: 100 MMHG | HEIGHT: 67 IN | OXYGEN SATURATION: 98 % | DIASTOLIC BLOOD PRESSURE: 80 MMHG | BODY MASS INDEX: 30.92 KG/M2 | WEIGHT: 197 LBS

## 2021-10-14 PROCEDURE — 99214 OFFICE O/P EST MOD 30 MIN: CPT

## 2021-10-14 NOTE — HISTORY OF PRESENT ILLNESS
[FreeTextEntry1] : 35 year old female with Sjogren's, with Ro+, hypothyroid, leukopenia, also with history c/w fibromyalgia, irritable bowel syndrome (bloating) anxiety and multiple non-drug allergies, followed by pain management in past, neurology, ortho and GI. Primary issues have been of generalized pain. On long term prednisone with difficulty tapering, on 4mg/d since ~early 2015. 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 Tried pilocarpine for dryness, but unable to tolerate.\par \par --Received an epidural into lumbar spine 6/15/16with excellent relief of low back pain (which radiated to pelvis and legs). Previous pain physicians included Dr Case, but insurance no longer taken; had been followed by Dr Morel ([facility])--last appointment in July '16--told that MRI of c-spine were without change. Continues on lyrica and topamax; \par \par s/p fall in May'2016--w surgery (9/16) for ruptured flexor tendon right hand and ulnar nerve release at right elbow.\par \par 9/7/17 Feels that everything has been bad this summer with c/o fatigue, arthralgia, myalgia, chest pain, nausea, bloating, hair loss. No photosensitivity, rash, oral ulcers, joint swelling. Has been off lyrica since ~Nov 2016 (didn't have a neurologist) but not restarted as she is contemplating pregnancy. She often misses a dose of plaquenil (taking bid). Using flexeril. Continues on pred 4mg (unable to decrease to 3mg). Now followed by Dr Johnson (neurology)--mri of pituitary with a "finding"--question aneurism and she is to have follow-up with a mra\par \par 1/11/2018 Has lost 50 lbs since last April.  Has been seen by GI, had endoscopy and colonoscopy at [facility] and she will have results next week. She does have constipation/diarrhea/loss of appetite.  Amylase/lipase were normal  She was told of "inflammation".  Saw Dr Ray (endocrine) and told of thyroid nodules--no sonogram yet secondary to scheduling difficulties.  Continues on plaquenil 400mg at bedtime, continues on prednisone 4mg, ran out of flexeril last month and notes that she is "out of whack" with lower back pain, more joint pain in her mcp's, pip's, diip's, wrists, shoulders, ankles, hips (not in groin) spares knees; also w pain in calves.  Received an injection into her right SI joint in 12/7/17 by pain management. Saw optomitrist 11/28, and had multiple tests--told that everything was fine. Additionally taken off of lyrica as she was considering pregnancy.\par \par 4/12/18- ruptured eardrum on feb 19th, told of difficulties with balance (Mar 26) by ENT and is scheduled for testing April 30th  (Dr Elio Dupree); Also seeing a new neurologist--repeat MRI of pituitary gland reportedly showed nothing.  Placed back on lyrica last Friday. No additional weight loss, and she has a repeat GI appointment; saw optho for plaquenil toxicity this week--given a script for dry eyes but not covered by insurance--using tears and pazao (anti-histamine drops).  No fevers, +night sweats, feels that her legs "are on fire". Started [place] program last week.\par \par 7/12/18--f/uliving in a shelter with her son, no kitchen facilities.  Meals are prepared for her and she has had difficulties tolerating the food and notes skin rashes, stiffness and feeling swollen.  Fell off the [employer] program; has seen an allergist and was given a ketoconizole shampoo and referred to dermatoloogy (but no appointments until November).  Also seen by a voice specialist and told by a muscle tension; receiving balance therapy (after ruptured eardrup).  No fever, anorexia, weight loss, alopecia, +fatigue and generalized pain. \par \par 10/18/18 f/u fibromyalgia, sjogren's syndrome--continuing with vestibular (balance) therapy--told that her eyes may be causing her dizzyness.  To review: dizziness  increased after rupture of right eardrum (secondary to a sinus infection in February).  Major complaint is of fatigue (under stress with social situation), Also with pain in shoulders, low back, hips, legs.notes pruritiis of her legs since August-hasn't changed detergent, but she scratches to point of bruising--saw endocrine 2 days ago and labs checked (nl lft's and bili); she has gained 30lbs since March. Continues on plaquenil 400mg, prednisone 4mg.  Saw neurology on Monday (and will be getting a f/u MRI/MRA for f/u pituitary).  Now taking flexeril at bedtime, also with increase in lyrica.  TSH checked by endocrine 2 days ago (low)\par \par 1/31/19  f/u SS, fibromyalgia--followed by neurology/pain, last seen in November (missed appointment this month secondary to her son's illness).  Major complaint is of fatigue--she is not sleeping well, using trazadone (but not during the weekdays); saw a new psych 12/4/18.  Doesn't remember the last time she was at pain management.  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  Pain continues (lower back, legs, shoulders, neck) and is worsened by rest and if she thinks about it, better with heat; additionally notes swelling of fingers. Was able to decrease  prednisone to 4 mg alternating with 3mg without noticing a change. (is taking prednisone at night). Bunions (bilateral) painful (but has been told that she cannot have surgery because of Raynaud's.  Notes alopecia (feels her hair is thin), +Raynaud's, no fever, weight loss,lymphadenopathy, \par 5/16/19  f/u SS, fibromyalgia--   ran out of lyrica (pain in lower back is increasing) for ~1 month and hasn't been back to neurologist; she is s/p a fall taking her son to school on 4/23/19, didn't go to ER or urgent care nor has she made an appointment with her neurologist ([place]); has additionally not returned to her pcp.  She continues without change in her multiple complaints/symptoms--continues with fatigue and continues to not sleep well (using trazadone (but not during the weekdays); Generalized pain continues in lower back, legs, shoulders, neck) and is worsened by rest and if she thinks about it, better with heat; (and she has not returned to pain management).  She has been able to tolerated the prednisone 4 mg alternating with 3mg.  Social issues unchanged (has been in shetlers and has moved 3 x over the past year).   Bunions (bilateral) painful (but has been told that she cannot have surgery because of Raynaud's.  States that her hair is thin, +Raynaud's, no fever, weight loss,lymphadenopathy, \par \par Sept 19, 2019\par States that she has been off lyrica (never restarted) and tapered off topamax in mid-July.  She states that she has been feeling well.  Continues on Pred 4/3mg (4mg on Tues and Thur only), plaquenil, Her main complaints are of right upper extremity pain in trapezius which radiates to neck, and down her hands.  Of note she "dislocated her knee" on Memorial Day weekend.  To ER the next day, cane given and an MRI obtained.  Continues to note fatigue (she wakes up frequently), but hair is growing, no Raynaud's, joint pain/swelling/stiffness, anorexia, ulcers, swollen lymph glands, unexplained fevers, rash.  Podiatry injected both bunions with substantial relief. She saw opthalmology, but didn't return for plaquenil toxicity check.  She additionally did not take the weekly vit D2, but is taking otc vit D3 2000IU daily.\par \par August 27, 2020\par f/u Sjogren's/fibromyalgia, doing well on prednisone 3mg since December and plaquenil.  She has moved into an apartment last October (I haven't seen her since Sept).  Denies fatigue, alopecia, joint pain/swelling/stiffness, lymphadnopathy, fever, rash, anorexia, anosmia, loss of taste.  She had a covid test yesterday (her  uses the public transportation for his job).  Of note, she states that she has had right upper and lower extremity weakiness for 9 years. \par \par April 7, 2021\par /u Sjogren's/fibromyalgia, doing well on prednisone 2mg alt w 1mg since December. Occasional muscle pain behind breasts exacerbated by certain movements.  Awaiting a songram and mammogram. Has been losing weight (post COVID quarantine), but notes poor appetite since she increased to over 200lbs.  No fever, rash, anorexia, weight loss, alopecia, lymphadenopathy, chills, joint pain/swelling/stiffness, oral ulcers, fatigue, dry eyes, chest pain, shortness of breath, cough, anosmia, ageusia, nausea, vomiting, diarrhea, abdominal pain.  Is considering the COVID vaccine.\par \par October 14, 2021\par f/u Sjogren's/fibromyalgia; is feeling well.  Has been working 4 days a week for 5 hrs/day ([employer]); living in an apartment for 2 years. Occasional fatigue, has been losing weight intentionally.  She notes hand stiffness in the morning several days a week, no fever, rash, alopecia, lymphadenopathy, chills, joint pain/swelling, oral ulcers,  dry eyes, chest pain (she has a history of asthma and will be seeing a new pulmonolgist next month), shortness of breath, cough, anosmia, ageusia, nausea, vomiting, diarrhea, abdominal pain. Continues on prednisone 2mg alternating with 1 mg.  Received the Pfizer vaccine in April and May.  Her main complaint is of a reoccurrence since August  of right trochanteric pain (initially in 2011). \par \par Meds: Prednisone 2mg alt w 1mgmg, Plaquenil 400mg next vft scheduled 11/18/21), vit D3 2000, loratadine, symbicort bid, vit B12, vit D, zrytec xyzal/Allegra 180, metamucil prn, synthroid 175,  ), \par off: diclofenac 75 prn, naprosyn 500mg, tylenol 3 prn, cyclobenzprine (10mg q hs\par \par PE: 197 lbs 100/80\par No rash, no alopecia, hair is growing, no oral or nasal ulcers, livedo, nodules, or lymphadenopathy. No synovitis/joint tenderness. Lungs: Clear without wheeze, rales, rhonch; Cor: S1S2 without murmur, gallop or rub; Abd: soft, NT, BS+;, Back: no spinal or paraspinal tenderness; Ext: no CCE; Neuro: non-focal\par \par \par Imp\par Sjogrens with fibromyalgia  on low dose prednisone, (previously on lyrica, topamax) no need for flexeril.  She is working, engaged in activities and doing well without signs or symptoms of inflammation by history or on exam.   Will continue plaquenil, has been taking pred to 2mg alt w 1mg/d, and is uneasy about tapering further.  Will decrease to 2mg m,w,f and 1 mg on other days.  Will check labs.  Will give voltaren gel for bursitis. \par HM-Pt has received flu and pneumonia shot last year--continue covid precautions.\par COVID--Patient to continue to social distance, to wear masks and to follow recommendations for hand hygiene.  Received the Pfizer vaccine in April and May.; have discussed booster (she is considering).

## 2021-10-14 NOTE — ASSESSMENT
[FreeTextEntry1] : See Assessment within History of Present Illness\par \par 30 minutes spent with this encounter including face to face time, charting and chart review.

## 2021-10-22 LAB
ALBUMIN SERPL ELPH-MCNC: 4.4 G/DL
ALP BLD-CCNC: 79 U/L
ALT SERPL-CCNC: 15 U/L
ANION GAP SERPL CALC-SCNC: 9 MMOL/L
APPEARANCE: CLEAR
AST SERPL-CCNC: 16 U/L
BASOPHILS # BLD AUTO: 0.02 K/UL
BASOPHILS NFR BLD AUTO: 0.6 %
BILIRUB SERPL-MCNC: 0.3 MG/DL
BILIRUBIN URINE: NEGATIVE
BLOOD URINE: ABNORMAL
BUN SERPL-MCNC: 11 MG/DL
CALCIUM SERPL-MCNC: 9.9 MG/DL
CHLORIDE SERPL-SCNC: 105 MMOL/L
CO2 SERPL-SCNC: 27 MMOL/L
COLOR: NORMAL
CREAT SERPL-MCNC: 0.68 MG/DL
EOSINOPHIL # BLD AUTO: 0.08 K/UL
EOSINOPHIL NFR BLD AUTO: 2.6 %
ERYTHROCYTE [SEDIMENTATION RATE] IN BLOOD BY WESTERGREN METHOD: 29 MM/HR
GLUCOSE QUALITATIVE U: NEGATIVE
GLUCOSE SERPL-MCNC: 99 MG/DL
HCT VFR BLD CALC: 43.1 %
HGB BLD-MCNC: 13.6 G/DL
IMM GRANULOCYTES NFR BLD AUTO: 0 %
KETONES URINE: NEGATIVE
LEUKOCYTE ESTERASE URINE: NEGATIVE
LYMPHOCYTES # BLD AUTO: 1.25 K/UL
LYMPHOCYTES NFR BLD AUTO: 39.9 %
MAN DIFF?: NORMAL
MCHC RBC-ENTMCNC: 27.8 PG
MCHC RBC-ENTMCNC: 31.6 GM/DL
MCV RBC AUTO: 88.1 FL
MONOCYTES # BLD AUTO: 0.27 K/UL
MONOCYTES NFR BLD AUTO: 8.6 %
NEUTROPHILS # BLD AUTO: 1.51 K/UL
NEUTROPHILS NFR BLD AUTO: 48.3 %
NITRITE URINE: NEGATIVE
PH URINE: 6.5
PLATELET # BLD AUTO: 290 K/UL
POTASSIUM SERPL-SCNC: 4.9 MMOL/L
PROT SERPL-MCNC: 7.1 G/DL
PROTEIN URINE: NORMAL
RBC # BLD: 4.89 M/UL
RBC # FLD: 12.9 %
RHEUMATOID FACT SER QL: 10 IU/ML
SODIUM SERPL-SCNC: 140 MMOL/L
SPECIFIC GRAVITY URINE: 1.02
UROBILINOGEN URINE: NORMAL
WBC # FLD AUTO: 3.13 K/UL

## 2021-12-02 ENCOUNTER — TRANSCRIPTION ENCOUNTER (OUTPATIENT)
Age: 36
End: 2021-12-02

## 2022-03-31 ENCOUNTER — TRANSCRIPTION ENCOUNTER (OUTPATIENT)
Age: 37
End: 2022-03-31

## 2022-04-06 ENCOUNTER — TRANSCRIPTION ENCOUNTER (OUTPATIENT)
Age: 37
End: 2022-04-06

## 2022-04-28 ENCOUNTER — APPOINTMENT (OUTPATIENT)
Dept: ENDOCRINOLOGY | Facility: CLINIC | Age: 37
End: 2022-04-28
Payer: MEDICARE

## 2022-04-28 VITALS
DIASTOLIC BLOOD PRESSURE: 75 MMHG | HEART RATE: 87 BPM | HEIGHT: 67 IN | WEIGHT: 201 LBS | SYSTOLIC BLOOD PRESSURE: 121 MMHG | BODY MASS INDEX: 31.55 KG/M2

## 2022-04-28 PROCEDURE — 99215 OFFICE O/P EST HI 40 MIN: CPT

## 2022-04-28 PROCEDURE — 99214 OFFICE O/P EST MOD 30 MIN: CPT

## 2022-04-29 NOTE — DATA REVIEWED
[FreeTextEntry1] : Labs:\par - 10/14/21: s.creat 0.68, urine trace protein\par - 04/08/21: s.creat 0.69\par - 03/09/21: TSH 0.03 (L), Free T4 1.4, Free T3 2.66, TPO Ab 19.8, Thyroglobulin Ab 100.0 (H), Vit D 25-OH 38.0\par - 02/03/21: A1c 5.6% (could not find record), TSH 0.01, Free T4 1.3\par - 08/27/20: Ca 9.5, s. creat 0.70, Vit D 25- OH 44.6\par - 02/26/20: TSH 7.17, Free T4 0.5\par - 01/06/20: A1c 5.8%, TSH 0.73, TPO ab 32.0, Thyroglobulin ab 178.0, Free T4 1.2,  Prolactin 13.5, s. creat 0.76\par - 01/31/19: TSH 0.16, Total T4 7.9\par - 10/2018: TSH 0.08.\par \par Imaging:\par - 01/07/20 Thyroid US: Impression: Small and heterogenous echogenicity thyroid gland without discrete nodule or cyst. \par - 04/2018 thyroid US shows no nodules. \par - 03/07/17 Pituitary MRI: there is 1 x 1 mm round focus along the superior aspect of the pituitary gland immediately to the L of midline abutting the infundibulum which remains hypointense on post contrast imaging without mass effect. \par

## 2022-04-29 NOTE — ADDENDUM
[FreeTextEntry1] : I Julian Jonah act soley as a scribe for Dr. Christopher Ray on this date. 04/28/2022

## 2022-04-29 NOTE — HISTORY OF PRESENT ILLNESS
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 On IUD. \par \par 3/9/21\par Pt states she was dx with Hypothyroidism at age 8 and has been on Synthroid since then. \par \par Pt notes she started Prednisone in 2013 first as 5mg which decreased to 4mg. She notes she is currently taking 1mg 2 pills 4x7 and 1 pill 1x7. \par \par Endocrine note from 7/2014: Pt was on thyroid supplementations 137mcg on 3/2014. 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 A modified water deprivation test was performed, rule out DI. Pt was dx with Sjogren, SLE and RA." \par \par Today pt presents for endocrine f/u, feeling well. She notes she has Migraines and she took Topamax in the past for this. Pt is trying to lose weight and saw her PCP. She notes she has an appointment to see Rheumatologist. She does not have an appointment to see Neurologist. \par Denies irregular menses, having plans for pregnancy.\par \par 04/28/2022\par Pt presents today for endocrine f/u, feeling well, with no physical complaints. She has an IUD and denies irregular menses. She gained 4 lbs since October 2021. \par Pt takes prednisone (alternates 2 mg and 1 mg qd) and Plaquenil for rheumatoid arthritis. She is also on Synthroid 175 mcg. \par Her most recent rheumatologist visit was in October 2021. She also had eye exam recently.  \par Denies irregular menses, breathing difficulties, dysphonia, dysphagia, polydipsia, and polyuria. \par  \par Current Medications: Plaquenil 200 BID, Synthroid 175 mcg, Prednisone QD (started in 2013), Trileptal, Loratadine\par

## 2022-04-29 NOTE — REVIEW OF SYSTEMS
[Negative] : Psychiatric [As Noted in HPI] : as noted in HPI [Recent Weight Gain (___ Lbs)] : recent weight gain: [unfilled] lbs [Dysphagia] : no dysphagia [Dysphonia] : no dysphonia [Difficulty Breathing] : no dyspnea [Irregular Menses] : regular menses [Polyuria] : no polyuria [Polydipsia] : no polydipsia

## 2022-04-29 NOTE — END OF VISIT
[FreeTextEntry3] : All medical record entries made by the Scribe were at my, Dr. Christopher Ray, direction and personally dictated by me on 04/28/2022. I have reviewed the chart and agree that the record accurately reflects my personal performance of the history, physical exam, assessment and plan. I have also personally directed, reviewed and agreed with the chart.  [Time Spent: ___ minutes] : I have spent [unfilled] minutes of time on the encounter.

## 2022-04-29 NOTE — ASSESSMENT
[Levothyroxine] : The patient was instructed to take Levothyroxine on an empty stomach, separate from vitamins, and wait at least 30 minutes before eating [Diabetic Medications] : Risks and benefits of diabetic medications were discussed [FreeTextEntry1] : 35 y/o F pt with:\par \par 1. Hypothyroidism diagnosed at age 8:\par Pt appears to be clinically euthyroid. However, her recent TSH was suppressed. \par She fully understands and is aware of how to take Synthroid. \par Send in TFTs today and make necessary adjustments as needed.\par \par 2. History of pre- DM:\par Pt continues effort to prevent weight gain and tries to be physically active. \par Sending metabolic and lipid profile today. \par We briefly discussed initiation of Metformin. \par \par 3. Pituitary cyst- Rathke's johana cyst diagnosed in 2014:\par Patient denies HA and visual disturbances. \par She is currently on IUD. \par No signs or symptoms of enlarging pituitary mass. \par Order prolactin levels. \par \par Return in 2 months. \par

## 2022-05-14 LAB
ALBUMIN SERPL ELPH-MCNC: 4.4 G/DL
ALP BLD-CCNC: 79 U/L
ALT SERPL-CCNC: 13 U/L
ANION GAP SERPL CALC-SCNC: 16 MMOL/L
AST SERPL-CCNC: 15 U/L
BILIRUB SERPL-MCNC: <0.2 MG/DL
BUN SERPL-MCNC: 10 MG/DL
CALCIUM SERPL-MCNC: 9.8 MG/DL
CHLORIDE SERPL-SCNC: 107 MMOL/L
CHOLEST SERPL-MCNC: 224 MG/DL
CO2 SERPL-SCNC: 19 MMOL/L
CREAT SERPL-MCNC: 0.67 MG/DL
EGFR: 117 ML/MIN/1.73M2
ESTIMATED AVERAGE GLUCOSE: 120 MG/DL
GLUCOSE SERPL-MCNC: 101 MG/DL
HBA1C MFR BLD HPLC: 5.8 %
HDLC SERPL-MCNC: 50 MG/DL
LDLC SERPL CALC-MCNC: 155 MG/DL
NONHDLC SERPL-MCNC: 174 MG/DL
POTASSIUM SERPL-SCNC: 5.1 MMOL/L
PROLACTIN SERPL-MCNC: 6.3 NG/ML
PROT SERPL-MCNC: 6.9 G/DL
SODIUM SERPL-SCNC: 143 MMOL/L
T4 FREE SERPL-MCNC: 1.6 NG/DL
TRIGL SERPL-MCNC: 94 MG/DL
TSH SERPL-ACNC: 0.01 UIU/ML

## 2022-05-26 ENCOUNTER — RX RENEWAL (OUTPATIENT)
Age: 37
End: 2022-05-26

## 2022-06-28 ENCOUNTER — APPOINTMENT (OUTPATIENT)
Dept: ENDOCRINOLOGY | Facility: CLINIC | Age: 37
End: 2022-06-28

## 2022-06-28 VITALS
HEART RATE: 75 BPM | SYSTOLIC BLOOD PRESSURE: 123 MMHG | WEIGHT: 198 LBS | DIASTOLIC BLOOD PRESSURE: 79 MMHG | BODY MASS INDEX: 31.01 KG/M2

## 2022-06-28 DIAGNOSIS — R73.09 OTHER ABNORMAL GLUCOSE: ICD-10-CM

## 2022-06-28 DIAGNOSIS — E04.9 NONTOXIC GOITER, UNSPECIFIED: ICD-10-CM

## 2022-06-28 PROCEDURE — 99214 OFFICE O/P EST MOD 30 MIN: CPT

## 2022-06-30 NOTE — ASSESSMENT
[Diabetic Medications] : Risks and benefits of diabetic medications were discussed [Levothyroxine] : The patient was instructed to take Levothyroxine on an empty stomach, separate from vitamins, and wait at least 30 minutes before eating [FreeTextEntry1] : 37 y/o F pt with:\par \par 1. Hypothyroidism diagnosed at age 8:  \par Pt is clinically euthyroid, however, her Free T4 is going up from 1.4 (03/21) to 1.6. The TSH is low likely due to history of pituitary cyst/Rathke cleft cyst \par For patients with hypothyroidism, total cholesterol tends to rise. Pt's total cholesterol is 224.\par For the time being, we recommend to decrease consumption of saturated and trans-fat. \par We will monitor her response in two months. \par Recommend to decrease levothyroxine from 175 mcg to 150 mcg. \par \par 2. History of pre -DM: \par No significant clinical changes of pre-DM. Her A1c continues to be 5.8%.\par Pt understands the benefit of weight reduction, and will continue efforts to improve her diet and physical activity.  \par Recommend appointment with RD and exercise . \par \par Return in 2 months.  \par

## 2022-06-30 NOTE — PHYSICAL EXAM
[Alert] : alert [Normal Sclera/Conjunctiva] : normal sclera/conjunctiva [Normal Outer Ear/Nose] : the ears and nose were normal in appearance [No Respiratory Distress] : no respiratory distress [Clear to Auscultation] : lungs were clear to auscultation bilaterally [Normal S1, S2] : normal S1 and S2 [Normal Rate] : heart rate was normal [Regular Rhythm] : with a regular rhythm [No Edema] : no peripheral edema [Pedal Pulses Normal] : the pedal pulses are present [Normal Bowel Sounds] : normal bowel sounds [Spine Straight] : spine straight [No Stigmata of Cushings Syndrome] : no stigmata of Cushings Syndrome [Normal Gait] : normal gait [No Tremors] : no tremors [Oriented x3] : oriented to person, place, and time [Acanthosis Nigricans] : no acanthosis nigricans [de-identified] : R lobe palpated

## 2022-06-30 NOTE — END OF VISIT
[FreeTextEntry3] : All medical record entries made by the Scribe were at my, Dr. Christopher Ray, direction and personally dictated by me on 06/28/2022. I have reviewed the chart and agree that the record accurately reflects my personal performance of the history, physical exam, assessment and plan. I have also personally, directed, reviewed and agreed with the chart  [FreeTextEntry2] : I, Mally Calles, acted solely as a scribe for Dr. Christopher Ray on this date 06/28/2022  [Time Spent: ___ minutes] : I have spent [unfilled] minutes of time on the encounter.

## 2022-06-30 NOTE — DATA REVIEWED
[FreeTextEntry1] : Labs:  \par - 04/28/22: A1c 5.8%, TSH 0.01 (L), Free T4 1.6, LDL-c 155 (H), s. creatine 0.67,   \par - 10/14/21: s.creat 0.68, urine trace protein\par - 04/08/21: s.creat 0.69\par - 03/09/21: TSH 0.03 (L), Free T4 1.4, Free T3 2.66, TPO Ab 19.8, Thyroglobulin Ab 100.0 (H), Vit D 25-OH 38.0\par - 02/03/21: A1c 5.6% (could not find record), TSH 0.01, Free T4 1.3\par - 08/27/20: Ca 9.5, s. creat 0.70, Vit D 25- OH 44.6\par - 02/26/20: TSH 7.17, Free T4 0.5\par - 01/06/20: A1c 5.8%, TSH 0.73, TPO ab 32.0, Thyroglobulin ab 178.0, Free T4 1.2,  Prolactin 13.5, s. creat 0.76\par - 01/31/19: TSH 0.16, Total T4 7.9\par - 10/2018: TSH 0.08.\par \par Imaging:\par - 01/07/20 Thyroid US: Impression: Small and heterogenous echogenicity thyroid gland without discrete nodule or cyst. \par - 04/2018 thyroid US shows no nodules. \par - 03/07/17 Pituitary MRI: there is 1 x 1 mm round focus along the superior aspect of the pituitary gland immediately to the L of midline abutting the infundibulum which remains hypointense on post contrast imaging without mass effect. \par

## 2022-06-30 NOTE — REVIEW OF SYSTEMS
[Recent Weight Gain (___ Lbs)] : recent weight gain: [unfilled] lbs [Negative] : Heme/Lymph [As Noted in HPI] : as noted in HPI [Dysphagia] : no dysphagia [Dysphonia] : no dysphonia [Difficulty Breathing] : no dyspnea [Polyuria] : no polyuria [Irregular Menses] : regular menses [Headaches] : no headaches [Polydipsia] : no polydipsia [FreeTextEntry3] : No visual disturbances

## 2022-06-30 NOTE — ADDENDUM
[FreeTextEntry1] : I, Mally Calles, acted solely as a scribe for Dr. Christopher Ray on this date 06/28/2022

## 2022-06-30 NOTE — HISTORY OF PRESENT ILLNESS
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 On IUD.  \par LMP: 06/28/2022 \par \par 3/9/21\par Pt states she was dx with Hypothyroidism at age 8 and has been on Synthroid since then. \par \par Pt notes she started Prednisone in 2013 first as 5mg which decreased to 4mg. She notes she is currently taking 1mg 2 pills 4x7 and 1 pill 1x7. \par \par Endocrine note from 7/2014: Pt was on thyroid supplementations 137mcg on 3/2014. 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 A modified water deprivation test was performed, rule out DI. Pt was dx with Sjogren, SLE and RA." \par \par Today pt presents for endocrine f/u, feeling well. She notes she has Migraines and she took Topamax in the past for this. Pt is trying to lose weight and saw her PCP. She notes she has an appointment to see Rheumatologist. She does not have an appointment to see Neurologist. \par Denies irregular menses, having plans for pregnancy.\par \par 04/28/2022\par Pt presents today for endocrine f/u, feeling well, with no physical complaints. She has an IUD and denies irregular menses. She gained 4 lbs since October 2021. \par Pt takes prednisone (alternates 2 mg and 1 mg qd) and Plaquenil for rheumatoid arthritis. She is also on Synthroid 175 mcg. \par Her most recent rheumatologist visit was in October 2021. She also had eye exam recently.  \par Denies irregular menses, breathing difficulties, dysphonia, dysphagia, polydipsia, and polyuria.  \par \par 06/28/2022\par Pt presents today with her son, feeling well, with no physical complaints. Pt was diagnosed with SLE in 2013, and has appointment with rheumatologist next month. Pt has LDL-c of 155 on 04/28/22, and previous LDL-c of 125 on 10/16/18.\par Denies HA, and vision disturbances. \par  \par Current Medications: Plaquenil 200 BID, Synthroid 175 mcg, Prednisone QD (started in 2013), Trileptal, Loratadine

## 2022-07-06 ENCOUNTER — TRANSCRIPTION ENCOUNTER (OUTPATIENT)
Age: 37
End: 2022-07-06

## 2022-08-18 ENCOUNTER — APPOINTMENT (OUTPATIENT)
Dept: RHEUMATOLOGY | Facility: CLINIC | Age: 37
End: 2022-08-18

## 2022-09-22 NOTE — REASON FOR VISIT
Pt here for follow up and injection. No new complaints.     Outpatient Oncology Care Plan  Problem list:  knowledge deficit    Problems related to:    disease/disease progression    Interventions:  provided general teaching    Expected outcomes:  understands plan of care    Progress towards outcome:  making progress    Education Record    Learner:  Patient and Spouse  Barriers / Limitations:  None  Method:  Brief focused  Outcome:  Shows understanding  Comments: [Follow-Up: _____] : a [unfilled] follow-up visit

## 2022-10-11 ENCOUNTER — APPOINTMENT (OUTPATIENT)
Dept: ENDOCRINOLOGY | Facility: CLINIC | Age: 37
End: 2022-10-11

## 2022-10-11 VITALS
HEART RATE: 83 BPM | BODY MASS INDEX: 30.92 KG/M2 | DIASTOLIC BLOOD PRESSURE: 83 MMHG | WEIGHT: 197 LBS | SYSTOLIC BLOOD PRESSURE: 122 MMHG | HEIGHT: 67 IN

## 2022-10-11 DIAGNOSIS — E66.9 OBESITY, UNSPECIFIED: ICD-10-CM

## 2022-10-11 PROCEDURE — 99215 OFFICE O/P EST HI 40 MIN: CPT

## 2022-10-11 RX ORDER — LEVOTHYROXINE SODIUM 175 UG/1
175 TABLET ORAL
Qty: 30 | Refills: 0 | Status: DISCONTINUED | COMMUNITY
Start: 2020-03-17 | End: 2022-10-11

## 2022-10-11 RX ORDER — LEVOTHYROXINE SODIUM 175 UG/1
175 TABLET ORAL
Qty: 90 | Refills: 3 | Status: DISCONTINUED | COMMUNITY
Start: 2020-03-23 | End: 2022-10-11

## 2022-10-11 RX ORDER — LEVOTHYROXINE SODIUM 0.17 MG/1
175 TABLET ORAL
Qty: 90 | Refills: 0 | Status: DISCONTINUED | COMMUNITY
Start: 2017-05-11 | End: 2022-10-11

## 2022-10-11 NOTE — REASON FOR VISIT
[Follow - Up] : a follow-up visit [Hypothyroidism] : hypothyroidism [Other___] : [unfilled] [Weight Management/Obesity] : weight management/obesity

## 2022-10-12 PROBLEM — E66.9 OBESITY (BMI 30-39.9): Status: ACTIVE | Noted: 2022-10-11

## 2022-10-12 LAB
T3FREE SERPL-MCNC: 3.12 PG/ML
T4 FREE SERPL-MCNC: 1.6 NG/DL
TSH SERPL-ACNC: 0.01 UIU/ML

## 2022-10-12 NOTE — PHYSICAL EXAM
[Alert] : alert [Normal Sclera/Conjunctiva] : normal sclera/conjunctiva [Normal Outer Ear/Nose] : the ears and nose were normal in appearance [No Respiratory Distress] : no respiratory distress [Clear to Auscultation] : lungs were clear to auscultation bilaterally [Normal S1, S2] : normal S1 and S2 [Normal Rate] : heart rate was normal [Regular Rhythm] : with a regular rhythm [No Edema] : no peripheral edema [Pedal Pulses Normal] : the pedal pulses are present [Normal Bowel Sounds] : normal bowel sounds [Spine Straight] : spine straight [No Stigmata of Cushings Syndrome] : no stigmata of Cushings Syndrome [Normal Gait] : normal gait [No Tremors] : no tremors [Oriented x3] : oriented to person, place, and time [Acanthosis Nigricans] : no acanthosis nigricans [de-identified] : Thyroid gland palpated.

## 2022-10-12 NOTE — END OF VISIT
[FreeTextEntry3] : All medical record entries made by the Scribe were at my, Dr. Christopher Ray, direction and personally dictated by me on 10/11/2022. I have reviewed the chart and agree that the record accurately reflects my personal performance of the history, physical exam, assessment and plan. I have also personally, directed, reviewed and agreed with the chart  [Time Spent: ___ minutes] : I have spent [unfilled] minutes of time on the encounter.

## 2022-10-12 NOTE — ADDENDUM
[FreeTextEntry1] : I, Mally Calles, acted solely as a scribe for Dr. Christopher Ray on this date 10/11/2022

## 2022-10-12 NOTE — HISTORY OF PRESENT ILLNESS
[FreeTextEntry1] : 37 y/o F pt initially seen endo at [facility] in 2014, with Hx of hypothyroidism (dx at age 8; has been on Synthroid since age 8), pituitary microadenoma vs Rathke's cleft cyst (dx in 2014), pre- DM (elevated A1c since 2/2015), and rheumatologic diseases (SLE, RA, Sjogren, hypothyroidism, Vit D insufficiency). \par No PMHx of osteoarthritis, fatty liver disease, sleep apnea disorder, gout. No comorbidities associated with obesity. \par Sees rheumatologist: October 2021 (noted 04/28/22)\par Pt was pregnant once (has a 8 y/o child)\par Immunization: COVID\par Normal menses. On IUD.  \par LMP: 06/28/2022 \par \par 3/9/21\par Pt states she was dx with Hypothyroidism at age 8 and has been on Synthroid since then. \par \par Pt notes she started Prednisone in 2013 first as 5mg which decreased to 4mg. She notes she is currently taking 1mg 2 pills 4x7 and 1 pill 1x7. \par \par Endocrine note from 7/2014: Pt was on thyroid supplementations 137mcg on 3/2014. 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 A modified water deprivation test was performed, rule out DI. Pt was dx with Sjogren, SLE and RA." \par \par Today pt presents for endocrine f/u, feeling well. She notes she has Migraines and she took Topamax in the past for this. Pt is trying to lose weight and saw her PCP. She notes she has an appointment to see Rheumatologist. She does not have an appointment to see Neurologist. \par Denies irregular menses, having plans for pregnancy.\par \par 04/28/2022\par Pt presents today for endocrine f/u, feeling well, with no physical complaints. She has an IUD and denies irregular menses. She gained 4 lbs since October 2021. \par Pt takes prednisone (alternates 2 mg and 1 mg qd) and Plaquenil for rheumatoid arthritis. She is also on Synthroid 175 mcg. \par Her most recent rheumatologist visit was in October 2021. She also had eye exam recently.  \par Denies irregular menses, breathing difficulties, dysphonia, dysphagia, polydipsia, and polyuria.  \par \par 06/28/2022\par Pt presents today with her son, feeling well, with no physical complaints. Pt was diagnosed with SLE in 2013, and has appointment with rheumatologist next month. Pt has LDL-c of 155 on 04/28/22, and previous LDL-c of 125 on 10/16/18.\par Denies HA, and vision disturbances. \par \par 10/11/2022 \par Pt presents today for endocrine f/u, feeling well with no physical complaints. She is taking levothyroxine 150 mcg. No speech or breathing difficulties. Endorses occasional swallowing difficulties. \par \par Current Medications: Plaquenil 200 BID, Synthroid 150 mcg, Prednisone QD (started in 2013), Trileptal, Loratadine  \par Medication modified/added this visit: Synthroid 137 mcg (decreased on 10/11/2022 )

## 2022-10-12 NOTE — ASSESSMENT
[Diabetic Medications] : Risks and benefits of diabetic medications were discussed [Levothyroxine] : The patient was instructed to take Levothyroxine on an empty stomach, separate from vitamins, and wait at least 30 minutes before eating [Carbohydrate Consistent Diet] : carbohydrate consistent diet [Importance of Diet and Exercise] : importance of diet and exercise to improve glycemic control, achieve weight loss and improve cardiovascular health

## 2022-10-13 ENCOUNTER — APPOINTMENT (OUTPATIENT)
Dept: RHEUMATOLOGY | Facility: CLINIC | Age: 37
End: 2022-10-13

## 2022-10-13 VITALS
HEART RATE: 84 BPM | RESPIRATION RATE: 16 BRPM | SYSTOLIC BLOOD PRESSURE: 121 MMHG | HEIGHT: 67 IN | OXYGEN SATURATION: 98 % | TEMPERATURE: 98.5 F | WEIGHT: 198 LBS | DIASTOLIC BLOOD PRESSURE: 83 MMHG | BODY MASS INDEX: 31.08 KG/M2

## 2022-10-13 PROCEDURE — 99213 OFFICE O/P EST LOW 20 MIN: CPT

## 2022-10-14 RX ORDER — LEVOTHYROXINE SODIUM 0.14 MG/1
137 TABLET ORAL
Qty: 30 | Refills: 2 | Status: ACTIVE | COMMUNITY
Start: 2022-10-14 | End: 1900-01-01

## 2022-10-27 NOTE — HISTORY OF PRESENT ILLNESS
[FreeTextEntry1] : 36 year old female with Sjogren's, with Ro+, hypothyroid, leukopenia, also with history c/w fibromyalgia, irritable bowel syndrome (bloating) anxiety and multiple non-drug allergies, followed by pain management in past, neurology, ortho and GI. Primary issues have been of generalized pain. On long term prednisone with difficulty tapering, on 4mg/d since ~early 2015. 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 Tried pilocarpine for dryness, but unable to tolerate.\par \par --Received an epidural into lumbar spine 6/15/16with excellent relief of low back pain (which radiated to pelvis and legs). Previous pain physicians included Dr Case, but insurance no longer taken; had been followed by Dr Morel ([facility])--last appointment in July '16--told that MRI of c-spine were without change. Continues on lyrica and topamax; \par \par s/p fall in May'2016--w surgery (9/16) for ruptured flexor tendon right hand and ulnar nerve release at right elbow.\par \par 9/7/17 Feels that everything has been bad this summer with c/o fatigue, arthralgia, myalgia, chest pain, nausea, bloating, hair loss. No photosensitivity, rash, oral ulcers, joint swelling. Has been off lyrica since ~Nov 2016 (didn't have a neurologist) but not restarted as she is contemplating pregnancy. She often misses a dose of plaquenil (taking bid). Using flexeril. Continues on pred 4mg (unable to decrease to 3mg). Now followed by Dr Johnson (neurology)--mri of pituitary with a "finding"--question aneurism and she is to have follow-up with a mra\par \par 1/11/2018 Has lost 50 lbs since last April.  Has been seen by GI, had endoscopy and colonoscopy at [facility] and she will have results next week. She does have constipation/diarrhea/loss of appetite.  Amylase/lipase were normal  She was told of "inflammation".  Saw Dr Ray (endocrine) and told of thyroid nodules--no sonogram yet secondary to scheduling difficulties.  Continues on plaquenil 400mg at bedtime, continues on prednisone 4mg, ran out of flexeril last month and notes that she is "out of whack" with lower back pain, more joint pain in her mcp's, pip's, diip's, wrists, shoulders, ankles, hips (not in groin) spares knees; also w pain in calves.  Received an injection into her right SI joint in 12/7/17 by pain management. Saw optomitrist 11/28, and had multiple tests--told that everything was fine. Additionally taken off of lyrica as she was considering pregnancy.\par \par 4/12/18- ruptured eardrum on feb 19th, told of difficulties with balance (Mar 26) by ENT and is scheduled for testing April 30th  (Dr Elio Dupree); Also seeing a new neurologist--repeat MRI of pituitary gland reportedly showed nothing.  Placed back on lyrica last Friday. No additional weight loss, and she has a repeat GI appointment; saw optho for plaquenil toxicity this week--given a script for dry eyes but not covered by insurance--using tears and pazao (anti-histamine drops).  No fevers, +night sweats, feels that her legs "are on fire". Started [place] program last week.\par \par 7/12/18--f/uliving in a shelter with her son, no kitchen facilities.  Meals are prepared for her and she has had difficulties tolerating the food and notes skin rashes, stiffness and feeling swollen.  Fell off the [employer] program; has seen an allergist and was given a ketoconizole shampoo and referred to dermatoloogy (but no appointments until November).  Also seen by a voice specialist and told by a muscle tension; receiving balance therapy (after ruptured eardrup).  No fever, anorexia, weight loss, alopecia, +fatigue and generalized pain. \par \par 10/18/18 f/u fibromyalgia, sjogren's syndrome--continuing with vestibular (balance) therapy--told that her eyes may be causing her dizzyness.  To review: dizziness  increased after rupture of right eardrum (secondary to a sinus infection in February).  Major complaint is of fatigue (under stress with social situation), Also with pain in shoulders, low back, hips, legs.notes pruritiis of her legs since August-hasn't changed detergent, but she scratches to point of bruising--saw endocrine 2 days ago and labs checked (nl lft's and bili); she has gained 30lbs since March. Continues on plaquenil 400mg, prednisone 4mg.  Saw neurology on Monday (and will be getting a f/u MRI/MRA for f/u pituitary).  Now taking flexeril at bedtime, also with increase in lyrica.  TSH checked by endocrine 2 days ago (low)\par \par 1/31/19  f/u SS, fibromyalgia--followed by neurology/pain, last seen in November (missed appointment this month secondary to her son's illness).  Major complaint is of fatigue--she is not sleeping well, using trazadone (but not during the weekdays); saw a new psych 12/4/18.  Doesn't remember the last time she was at pain management.  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  Pain continues (lower back, legs, shoulders, neck) and is worsened by rest and if she thinks about it, better with heat; additionally notes swelling of fingers. Was able to decrease  prednisone to 4 mg alternating with 3mg without noticing a change. (is taking prednisone at night). Bunions (bilateral) painful (but has been told that she cannot have surgery because of Raynaud's.  Notes alopecia (feels her hair is thin), +Raynaud's, no fever, weight loss,lymphadenopathy, \par 5/16/19  f/u SS, fibromyalgia--   ran out of lyrica (pain in lower back is increasing) for ~1 month and hasn't been back to neurologist; she is s/p a fall taking her son to school on 4/23/19, didn't go to ER or urgent care nor has she made an appointment with her neurologist ([place]); has additionally not returned to her pcp.  She continues without change in her multiple complaints/symptoms--continues with fatigue and continues to not sleep well (using trazadone (but not during the weekdays); Generalized pain continues in lower back, legs, shoulders, neck) and is worsened by rest and if she thinks about it, better with heat; (and she has not returned to pain management).  She has been able to tolerated the prednisone 4 mg alternating with 3mg.  Social issues unchanged (has been in shetlers and has moved 3 x over the past year).   Bunions (bilateral) painful (but has been told that she cannot have surgery because of Raynaud's.  States that her hair is thin, +Raynaud's, no fever, weight loss,lymphadenopathy, \par \par Sept 19, 2019\par States that she has been off lyrica (never restarted) and tapered off topamax in mid-July.  She states that she has been feeling well.  Continues on Pred 4/3mg (4mg on Tues and Thur only), plaquenil, Her main complaints are of right upper extremity pain in trapezius which radiates to neck, and down her hands.  Of note she "dislocated her knee" on Memorial Day weekend.  To ER the next day, cane given and an MRI obtained.  Continues to note fatigue (she wakes up frequently), but hair is growing, no Raynaud's, joint pain/swelling/stiffness, anorexia, ulcers, swollen lymph glands, unexplained fevers, rash.  Podiatry injected both bunions with substantial relief. She saw opthalmology, but didn't return for plaquenil toxicity check.  She additionally did not take the weekly vit D2, but is taking otc vit D3 2000IU daily.\par \par August 27, 2020\par f/u Sjogren's/fibromyalgia, doing well on prednisone 3mg since December and plaquenil.  She has moved into an apartment last October (I haven't seen her since Sept).  Denies fatigue, alopecia, joint pain/swelling/stiffness, lymphadnopathy, fever, rash, anorexia, anosmia, loss of taste.  She had a covid test yesterday (her  uses the public transportation for his job).  Of note, she states that she has had right upper and lower extremity weakiness for 9 years. \par \par April 7, 2021\par /u Sjogren's/fibromyalgia, doing well on prednisone 2mg alt w 1mg since December. Occasional muscle pain behind breasts exacerbated by certain movements.  Awaiting a songram and mammogram. Has been losing weight (post COVID quarantine), but notes poor appetite since she increased to over 200lbs.  No fever, rash, anorexia, weight loss, alopecia, lymphadenopathy, chills, joint pain/swelling/stiffness, oral ulcers, fatigue, dry eyes, chest pain, shortness of breath, cough, anosmia, ageusia, nausea, vomiting, diarrhea, abdominal pain.  Is considering the COVID vaccine.\par \par October 14, 2021\par f/u Sjogren's/fibromyalgia; is feeling well.  Has been working 4 days a week for 5 hrs/day ([employer]); living in an apartment for 2 years. Occasional fatigue, has been losing weight intentionally.  She notes hand stiffness in the morning several days a week, no fever, rash, alopecia, lymphadenopathy, chills, joint pain/swelling, oral ulcers,  dry eyes, chest pain (she has a history of asthma and will be seeing a new pulmonolgist next month), shortness of breath, cough, anosmia, ageusia, nausea, vomiting, diarrhea, abdominal pain. Continues on prednisone 2mg alternating with 1 mg.  Received the Pfizer vaccine in April and May.  Her main complaint is of a reoccurrence since August  of right trochanteric pain (initially in 2011). 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  Notes occasional aches and pains, particularly her right arm--wrist and hands, using alleve and voltaren gel with minimal relief.  She is back with pain management (who has recommended tizanadine); saw gastro(@ [facility]-- Kimberly Pichardo)  last week--endoscopy scheduled and omeprazole restarted (she has acid reflux since 2006). Told of HS by hidroadenitis suppurativa\par \par Meds: Prednisone 2mg alt w 1\par mgmg, Plaquenil 400mg last ophthal Dec 2021, vit D3, symbicort bid, vit B12, vit D, synthroid 137.5,, cyclobenzprine (10mg q hs\par off: diclofenac 75 prn, naprosyn 500mg, tylenol 3 prn, \par \par PE: 198 lbs 121/83\par No rash except 1 papule on left buttuck, no alopecia, hair is growing, no oral or nasal ulcers, livedo, nodules, or lymphadenopathy. No synovitis/joint tenderness. Lungs: Clear without wheeze, rales, rhonch; Cor: S1S2 without murmur, gallop or rub; Abd: soft, NT, BS+;, Back: no spinal or paraspinal tenderness; Ext: no CCE; Neuro: non-focal\par \par \par Imp\par Sjogrens with fibromyalgia  on low dose prednisone, (previously on lyrica, topamax)--pain management to switch  flexeril to tozanadine.  She is doing well without signs or symptoms of inflammation by history or on exam.   Will continue plaquenil, has been taking pred to 2mg alt w 1mg/d, and is willing to reduce further (will try 2mg 2x/week with 1mg 5x/wk.   Will check labs.  She will try capsaicin cream for right arm pain.\par . \par HM-  Will give derm refferral and cleomycin solun for ?hydroadenitis.  Pt has received flu and pneumonia shot last year--continue covid precautions.\par \par COVID--Patient to continue to social distance, to wear masks and to follow recommendations for hand hygiene.  Received the Pfizer vaccine in April and May.; have discussed booster (she is considering).

## 2022-11-25 NOTE — DATA REVIEWED
[FreeTextEntry1] : Labs:   \par - 10/04/22: A1c 5.8%, TSH 0.01 (L), Free T4 1.6, Free T3 3.12, Prolactin 6.3, \par - 04/28/22: A1c 5.8%, TSH 0.01 (L), Free T4 1.6, LDL-c 155 (H), s. creatine 0.67,   \par - 10/14/21: s.creat 0.68, urine trace protein\par - 04/08/21: s.creat 0.69\par - 03/09/21: TSH 0.03 (L), Free T4 1.4, Free T3 2.66, TPO Ab 19.8, Thyroglobulin Ab 100.0 (H), Vit D 25-OH 38.0\par - 02/03/21: A1c 5.6% (could not find record), TSH 0.01, Free T4 1.3\par - 08/27/20: Ca 9.5, s. creat 0.70, Vit D 25- OH 44.6\par - 02/26/20: TSH 7.17, Free T4 0.5\par - 01/06/20: A1c 5.8%, TSH 0.73, TPO ab 32.0, Thyroglobulin ab 178.0, Free T4 1.2,  Prolactin 13.5, s. creat 0.76\par - 01/31/19: TSH 0.16, Total T4 7.9\par - 10/2018: TSH 0.08.\par \par Imaging:\par - 01/07/20 Thyroid US: Impression: Small and heterogenous echogenicity thyroid gland without discrete nodule or cyst. \par - 04/2018 thyroid US shows no nodules. \par - 03/07/17 Pituitary MRI: there is 1 x 1 mm round focus along the superior aspect of the pituitary gland immediately to the L of midline abutting the infundibulum which remains hypointense on post contrast imaging without mass effect. \par  Previously Declined (within the last year)

## 2022-12-07 ENCOUNTER — TRANSCRIPTION ENCOUNTER (OUTPATIENT)
Age: 37
End: 2022-12-07

## 2022-12-07 ENCOUNTER — NON-APPOINTMENT (OUTPATIENT)
Age: 37
End: 2022-12-07

## 2022-12-09 ENCOUNTER — TRANSCRIPTION ENCOUNTER (OUTPATIENT)
Age: 37
End: 2022-12-09

## 2022-12-09 LAB
T3 SERPL-MCNC: 111 NG/DL
T4 FREE SERPL-MCNC: 1.3 NG/DL
TSH SERPL-ACNC: 0.02 UIU/ML

## 2023-01-06 ENCOUNTER — TRANSCRIPTION ENCOUNTER (OUTPATIENT)
Age: 38
End: 2023-01-06

## 2023-01-08 LAB
ALBUMIN SERPL ELPH-MCNC: 4.3 G/DL
ALP BLD-CCNC: 73 U/L
ALT SERPL-CCNC: 14 U/L
ANION GAP SERPL CALC-SCNC: 11 MMOL/L
APPEARANCE: CLEAR
AST SERPL-CCNC: 16 U/L
BASOPHILS # BLD AUTO: 0.02 K/UL
BASOPHILS NFR BLD AUTO: 0.5 %
BILIRUB SERPL-MCNC: 0.2 MG/DL
BILIRUBIN URINE: NEGATIVE
BLOOD URINE: NEGATIVE
BUN SERPL-MCNC: 11 MG/DL
C3 SERPL-MCNC: 119 MG/DL
C4 SERPL-MCNC: 35 MG/DL
CALCIUM SERPL-MCNC: 9.8 MG/DL
CHLORIDE SERPL-SCNC: 104 MMOL/L
CO2 SERPL-SCNC: 27 MMOL/L
COLOR: COLORLESS
CREAT SERPL-MCNC: 0.65 MG/DL
DSDNA AB SER-ACNC: 21 IU/ML
EGFR: 118 ML/MIN/1.73M2
EOSINOPHIL # BLD AUTO: 0.06 K/UL
EOSINOPHIL NFR BLD AUTO: 1.6 %
ERYTHROCYTE [SEDIMENTATION RATE] IN BLOOD BY WESTERGREN METHOD: 25 MM/HR
GLUCOSE QUALITATIVE U: NEGATIVE
GLUCOSE SERPL-MCNC: 95 MG/DL
HCT VFR BLD CALC: 44 %
HGB BLD-MCNC: 13.3 G/DL
IMM GRANULOCYTES NFR BLD AUTO: 0.3 %
KETONES URINE: NEGATIVE
LEUKOCYTE ESTERASE URINE: NEGATIVE
LYMPHOCYTES # BLD AUTO: 1.34 K/UL
LYMPHOCYTES NFR BLD AUTO: 36.1 %
MAN DIFF?: NORMAL
MCHC RBC-ENTMCNC: 27.4 PG
MCHC RBC-ENTMCNC: 30.2 GM/DL
MCV RBC AUTO: 90.7 FL
MONOCYTES # BLD AUTO: 0.27 K/UL
MONOCYTES NFR BLD AUTO: 7.3 %
NEUTROPHILS # BLD AUTO: 2.01 K/UL
NEUTROPHILS NFR BLD AUTO: 54.2 %
NITRITE URINE: NEGATIVE
PH URINE: 7.5
PLATELET # BLD AUTO: 303 K/UL
POTASSIUM SERPL-SCNC: 4.8 MMOL/L
PROT SERPL-MCNC: 7.1 G/DL
PROTEIN URINE: NEGATIVE
RBC # BLD: 4.85 M/UL
RBC # FLD: 13.6 %
SODIUM SERPL-SCNC: 142 MMOL/L
SPECIFIC GRAVITY URINE: 1.01
UROBILINOGEN URINE: NORMAL
WBC # FLD AUTO: 3.71 K/UL

## 2023-04-11 ENCOUNTER — APPOINTMENT (OUTPATIENT)
Dept: ENDOCRINOLOGY | Facility: CLINIC | Age: 38
End: 2023-04-11
Payer: MEDICARE

## 2023-04-11 VITALS
SYSTOLIC BLOOD PRESSURE: 122 MMHG | DIASTOLIC BLOOD PRESSURE: 81 MMHG | HEART RATE: 85 BPM | BODY MASS INDEX: 32.42 KG/M2 | WEIGHT: 207 LBS

## 2023-04-11 DIAGNOSIS — R73.03 PREDIABETES.: ICD-10-CM

## 2023-04-11 DIAGNOSIS — E23.6 OTHER DISORDERS OF PITUITARY GLAND: ICD-10-CM

## 2023-04-11 PROCEDURE — 99214 OFFICE O/P EST MOD 30 MIN: CPT

## 2023-04-11 NOTE — ASSESSMENT
[Carbohydrate Consistent Diet] : carbohydrate consistent diet [Importance of Diet and Exercise] : importance of diet and exercise to improve glycemic control, achieve weight loss and improve cardiovascular health [Diabetic Medications] : Risks and benefits of diabetic medications were discussed [Levothyroxine] : The patient was instructed to take Levothyroxine on an empty stomach, separate from vitamins, and wait at least 30 minutes before eating

## 2023-04-21 NOTE — DATA REVIEWED
[FreeTextEntry1] : Labs:   \par - 10/04/22: A1c 5.8%, TSH 0.01 (L), Free T4 1.6, Free T3 3.12, Prolactin 6.3, \par - 04/28/22: A1c 5.8%, TSH 0.01 (L), Free T4 1.6, LDL-c 155 (H), s. creatine 0.67,   \par - 10/14/21: s.creat 0.68, urine trace protein\par - 04/08/21: s.creat 0.69\par - 03/09/21: TSH 0.03 (L), Free T4 1.4, Free T3 2.66, TPO Ab 19.8, Thyroglobulin Ab 100.0 (H), Vit D 25-OH 38.0\par - 02/03/21: A1c 5.6% (could not find record), TSH 0.01, Free T4 1.3\par - 08/27/20: Ca 9.5, s. creat 0.70, Vit D 25- OH 44.6\par - 02/26/20: TSH 7.17, Free T4 0.5\par - 01/06/20: A1c 5.8%, TSH 0.73, TPO ab 32.0, Thyroglobulin ab 178.0, Free T4 1.2,  Prolactin 13.5, s. creat 0.76\par - 01/31/19: TSH 0.16, Total T4 7.9\par - 10/2018: TSH 0.08.\par \par Imaging:\par - 01/07/20 Thyroid US: Impression: Small and heterogenous echogenicity thyroid gland without discrete nodule or cyst. \par - 04/2018 thyroid US shows no nodules. \par - 03/07/17 Pituitary MRI: there is 1 x 1 mm round focus along the superior aspect of the pituitary gland immediately to the L of midline abutting the infundibulum which remains hypointense on post contrast imaging without mass effect. \par

## 2023-04-21 NOTE — ADDENDUM
[FreeTextEntry1] : I, Mally Calles, acted solely as a scribe for Dr. Christopher Ray on this date 04/11/2023

## 2023-04-21 NOTE — END OF VISIT
[Time Spent: ___ minutes] : I have spent [unfilled] minutes of time on the encounter. [FreeTextEntry3] : All medical record entries made by the Scribe were at my, Dr. Christopher Ray, direction and personally dictated by me on 04/11/2023. I have reviewed the chart and agree that the record accurately reflects my personal performance of the history, physical exam, assessment and plan. I have also personally, directed, reviewed and agreed with the chart

## 2023-04-21 NOTE — HISTORY OF PRESENT ILLNESS
[FreeTextEntry1] : 36 y/o F pt initially seen endo at [facility] in 2014, with Hx of hypothyroidism (dx at age 8; has been on Synthroid since age 8), pituitary microadenoma vs Rathke's cleft cyst (dx in 2014), pre- DM (elevated A1c since 2/2015), and rheumatologic diseases (SLE, RA, Sjogren, hypothyroidism, Vit D insufficiency). \par No PMHx of osteoarthritis, fatty liver disease, sleep apnea disorder, gout. No comorbidities associated with obesity. \par Sees rheumatologist: October 2021 (noted 04/28/22)\par Pt was pregnant once (has a 8 y/o child)\par Immunization: COVID\par Normal menses. On IUD.  \par LMP: 06/28/2022 \par Note: 02/26/20: Free T4 0.5, and TSH 7.1, TSH afterwards have been suppressed; 12/22: TSH 0.02 and free T4 1.3  \par \par 3/9/21\par Pt states she was dx with Hypothyroidism at age 8 and has been on Synthroid since then. \par \par Pt notes she started Prednisone in 2013 first as 5mg which decreased to 4mg. She notes she is currently taking 1mg 2 pills 4x7 and 1 pill 1x7. \par \par Endocrine note from 7/2014: Pt was on thyroid supplementations 137mcg on 3/2014. 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 A modified water deprivation test was performed, rule out DI. Pt was dx with Sjogren, SLE and RA." \par \par Today pt presents for endocrine f/u, feeling well. She notes she has Migraines and she took Topamax in the past for this. Pt is trying to lose weight and saw her PCP. She notes she has an appointment to see Rheumatologist. She does not have an appointment to see Neurologist. \par Denies irregular menses, having plans for pregnancy.\par \par 04/28/2022\par Pt presents today for endocrine f/u, feeling well, with no physical complaints. She has an IUD and denies irregular menses. She gained 4 lbs since October 2021. \par Pt takes prednisone (alternates 2 mg and 1 mg qd) and Plaquenil for rheumatoid arthritis. She is also on Synthroid 175 mcg. \par Her most recent rheumatologist visit was in October 2021. She also had eye exam recently.  \par Denies irregular menses, breathing difficulties, dysphonia, dysphagia, polydipsia, and polyuria.  \par \par 06/28/2022\par Pt presents today with her son, feeling well, with no physical complaints. Pt was diagnosed with SLE in 2013, and has appointment with rheumatologist next month. Pt has LDL-c of 155 on 04/28/22, and previous LDL-c of 125 on 10/16/18.\par Denies HA, and vision disturbances. \par \par 10/11/2022 \par Pt presents today for endocrine f/u, feeling well with no physical complaints. She is taking levothyroxine 150 mcg. No speech or breathing difficulties. Endorses occasional swallowing difficulties. \par \par 04/11/2023 \par CC: " I am okay. " Pt has not seen any new specialists and denies new hospitalizations. Since 10/22. she gained 10 lbs. \par Lifestyle: Dinner 8 pm,  Sleep 1 am. She takes Synthroid and Prednisone at the same time at 8 am.  \par Denies diarrhea. Since 12/22, she endorses hot flashes and HA. \par \par Current Medications: Plaquenil 200 BID, Synthroid 137 mcg (decreased on 10/11/2022 ), Prednisone QD (started in 2013), Trileptal, Loratadine   \par Medication modified/added this visit: Prolactin

## 2023-04-21 NOTE — PHYSICAL EXAM
[Alert] : alert [Normal Sclera/Conjunctiva] : normal sclera/conjunctiva [Normal Outer Ear/Nose] : the ears and nose were normal in appearance [No Respiratory Distress] : no respiratory distress [Clear to Auscultation] : lungs were clear to auscultation bilaterally [Normal S1, S2] : normal S1 and S2 [Normal Rate] : heart rate was normal [Regular Rhythm] : with a regular rhythm [No Edema] : no peripheral edema [Pedal Pulses Normal] : the pedal pulses are present [Normal Bowel Sounds] : normal bowel sounds [Spine Straight] : spine straight [No Stigmata of Cushings Syndrome] : no stigmata of Cushings Syndrome [Normal Gait] : normal gait [No Tremors] : no tremors [Oriented x3] : oriented to person, place, and time [Acanthosis Nigricans] : no acanthosis nigricans [de-identified] : Thyroid gland palpated.

## 2023-04-21 NOTE — REASON FOR VISIT
[Follow - Up] : a follow-up visit [Hypothyroidism] : hypothyroidism [Other___] : [unfilled] [Adrenal Evaluation/Adrenal Disorder] : adrenal evaluation/adrenal disorder

## 2023-05-17 ENCOUNTER — TRANSCRIPTION ENCOUNTER (OUTPATIENT)
Age: 38
End: 2023-05-17

## 2023-06-08 ENCOUNTER — NON-APPOINTMENT (OUTPATIENT)
Age: 38
End: 2023-06-08

## 2023-06-08 ENCOUNTER — APPOINTMENT (OUTPATIENT)
Dept: RHEUMATOLOGY | Facility: CLINIC | Age: 38
End: 2023-06-08
Payer: MEDICARE

## 2023-06-08 VITALS
SYSTOLIC BLOOD PRESSURE: 129 MMHG | DIASTOLIC BLOOD PRESSURE: 84 MMHG | OXYGEN SATURATION: 97 % | WEIGHT: 210 LBS | BODY MASS INDEX: 32.96 KG/M2 | HEART RATE: 82 BPM | HEIGHT: 67 IN | TEMPERATURE: 97.5 F

## 2023-06-08 LAB
ANION GAP SERPL CALC-SCNC: 12 MMOL/L
BUN SERPL-MCNC: 13 MG/DL
CALCIUM SERPL-MCNC: 10.1 MG/DL
CHLORIDE SERPL-SCNC: 104 MMOL/L
CHOLEST SERPL-MCNC: 203 MG/DL
CO2 SERPL-SCNC: 28 MMOL/L
CREAT SERPL-MCNC: 0.7 MG/DL
EGFR: 115 ML/MIN/1.73M2
ESTIMATED AVERAGE GLUCOSE: 128 MG/DL
GLUCOSE SERPL-MCNC: 103 MG/DL
HBA1C MFR BLD HPLC: 6.1 %
HDLC SERPL-MCNC: 51 MG/DL
LDLC SERPL CALC-MCNC: 126 MG/DL
NONHDLC SERPL-MCNC: 153 MG/DL
POTASSIUM SERPL-SCNC: 5.3 MMOL/L
PROLACTIN SERPL-MCNC: 8.5 NG/ML
SODIUM SERPL-SCNC: 144 MMOL/L
T3FREE SERPL-MCNC: 2.98 PG/ML
T4 FREE SERPL-MCNC: 1.5 NG/DL
THYROGLOB AB SERPL-ACNC: 67.4 IU/ML
THYROPEROXIDASE AB SERPL IA-ACNC: 12.4 IU/ML
TRIGL SERPL-MCNC: 135 MG/DL
TSH SERPL-ACNC: 0.04 UIU/ML

## 2023-06-08 PROCEDURE — 99214 OFFICE O/P EST MOD 30 MIN: CPT

## 2023-06-08 NOTE — HISTORY OF PRESENT ILLNESS
[FreeTextEntry1] : 37 year old female with Sjogren's, with Ro+, hypothyroid, leukopenia, also with history c/w fibromyalgia, irritable bowel syndrome (bloating) anxiety and multiple non-drug allergies, followed by pain management in past, neurology, ortho and GI. Primary issues have been of generalized pain. On long term prednisone with difficulty tapering, on 4mg/d since ~early 2015. 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 Tried pilocarpine for dryness, but unable to tolerate.\par \par --Received an epidural into lumbar spine 6/15/16with excellent relief of low back pain (which radiated to pelvis and legs). Previous pain physicians included Dr Case, but insurance no longer taken; had been followed by Dr Morel ([facility])--last appointment in July '16--told that MRI of c-spine were without change. Continues on lyrica and topamax; \par \par s/p fall in May'2016--w surgery (9/16) for ruptured flexor tendon right hand and ulnar nerve release at right elbow.\par \par 9/7/17 Feels that everything has been bad this summer with c/o fatigue, arthralgia, myalgia, chest pain, nausea, bloating, hair loss. No photosensitivity, rash, oral ulcers, joint swelling. Has been off lyrica since ~Nov 2016 (didn't have a neurologist) but not restarted as she is contemplating pregnancy. She often misses a dose of plaquenil (taking bid). Using flexeril. Continues on pred 4mg (unable to decrease to 3mg). Now followed by Dr Johnson (neurology)--mri of pituitary with a "finding"--question aneurism and she is to have follow-up with a mra\par \par 1/11/2018 Has lost 50 lbs since last April.  Has been seen by GI, had endoscopy and colonoscopy at [facility] and she will have results next week. She does have constipation/diarrhea/loss of appetite.  Amylase/lipase were normal  She was told of "inflammation".  Saw Dr Ray (endocrine) and told of thyroid nodules--no sonogram yet secondary to scheduling difficulties.  Continues on plaquenil 400mg at bedtime, continues on prednisone 4mg, ran out of flexeril last month and notes that she is "out of whack" with lower back pain, more joint pain in her mcp's, pip's, diip's, wrists, shoulders, ankles, hips (not in groin) spares knees; also w pain in calves.  Received an injection into her right SI joint in 12/7/17 by pain management. Saw optomitrist 11/28, and had multiple tests--told that everything was fine. Additionally taken off of lyrica as she was considering pregnancy.\par \par 4/12/18- ruptured eardrum on feb 19th, told of difficulties with balance (Mar 26) by ENT and is scheduled for testing April 30th  (Dr Elio Dupree); Also seeing a new neurologist--repeat MRI of pituitary gland reportedly showed nothing.  Placed back on lyrica last Friday. No additional weight loss, and she has a repeat GI appointment; saw optho for plaquenil toxicity this week--given a script for dry eyes but not covered by insurance--using tears and pazao (anti-histamine drops).  No fevers, +night sweats, feels that her legs "are on fire". Started [employer] program last week.\par \par 7/12/18--f/uliving in a shelter with her son, no kitchen facilities.  Meals are prepared for her and she has had difficulties tolerating the food and notes skin rashes, stiffness and feeling swollen.  Fell off the [employer] program; has seen an allergist and was given a ketoconizole shampoo and referred to dermatoloogy (but no appointments until November).  Also seen by a voice specialist and told by a muscle tension; receiving balance therapy (after ruptured eardrup).  No fever, anorexia, weight loss, alopecia, +fatigue and generalized pain. \par \par 10/18/18 f/u fibromyalgia, sjogren's syndrome--continuing with vestibular (balance) therapy--told that her eyes may be causing her dizzyness.  To review: dizziness  increased after rupture of right eardrum (secondary to a sinus infection in February).  Major complaint is of fatigue (under stress with social situation), Also with pain in shoulders, low back, hips, legs.notes pruritiis of her legs since August-hasn't changed detergent, but she scratches to point of bruising--saw endocrine 2 days ago and labs checked (nl lft's and bili); she has gained 30lbs since March. Continues on plaquenil 400mg, prednisone 4mg.  Saw neurology on Monday (and will be getting a f/u MRI/MRA for f/u pituitary).  Now taking flexeril at bedtime, also with increase in lyrica.  TSH checked by endocrine 2 days ago (low)\par \par 1/31/19  f/u SS, fibromyalgia--followed by neurology/pain, last seen in November (missed appointment this month secondary to her son's illness).  Major complaint is of fatigue--she is not sleeping well, using trazadone (but not during the weekdays); saw a new psych 12/4/18.  Doesn't remember the last time she was at pain management.  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  Pain continues (lower back, legs, shoulders, neck) and is worsened by rest and if she thinks about it, better with heat; additionally notes swelling of fingers. Was able to decrease  prednisone to 4 mg alternating with 3mg without noticing a change. (is taking prednisone at night). Bunions (bilateral) painful (but has been told that she cannot have surgery because of Raynaud's.  Notes alopecia (feels her hair is thin), +Raynaud's, no fever, weight loss,lymphadenopathy, \par 5/16/19  f/u SS, fibromyalgia--   ran out of lyrica (pain in lower back is increasing) for ~1 month and hasn't been back to neurologist; she is s/p a fall taking her son to school on 4/23/19, didn't go to ER or urgent care nor has she made an appointment with her neurologist ([place]); has additionally not returned to her pcp.  She continues without change in her multiple complaints/symptoms--continues with fatigue and continues to not sleep well (using trazadone (but not during the weekdays); Generalized pain continues in lower back, legs, shoulders, neck) and is worsened by rest and if she thinks about it, better with heat; (and she has not returned to pain management).  She has been able to tolerated the prednisone 4 mg alternating with 3mg.  Social issues unchanged (has been in shetlers and has moved 3 x over the past year).   Bunions (bilateral) painful (but has been told that she cannot have surgery because of Raynaud's.  States that her hair is thin, +Raynaud's, no fever, weight loss,lymphadenopathy, \par \par Sept 19, 2019\par States that she has been off lyrica (never restarted) and tapered off topamax in mid-July.  She states that she has been feeling well.  Continues on Pred 4/3mg (4mg on Tues and Thur only), plaquenil, Her main complaints are of right upper extremity pain in trapezius which radiates to neck, and down her hands.  Of note she "dislocated her knee" on Memorial Day weekend.  To ER the next day, cane given and an MRI obtained.  Continues to note fatigue (she wakes up frequently), but hair is growing, no Raynaud's, joint pain/swelling/stiffness, anorexia, ulcers, swollen lymph glands, unexplained fevers, rash.  Podiatry injected both bunions with substantial relief. She saw opthalmology, but didn't return for plaquenil toxicity check.  She additionally did not take the weekly vit D2, but is taking otc vit D3 2000IU daily.\par \par August 27, 2020\par f/u Sjogren's/fibromyalgia, doing well on prednisone 3mg since December and plaquenil.  She has moved into an apartment last October (I haven't seen her since Sept).  Denies fatigue, alopecia, joint pain/swelling/stiffness, lymphadnopathy, fever, rash, anorexia, anosmia, loss of taste.  She had a covid test yesterday (her  uses the public transportation for his job).  Of note, she states that she has had right upper and lower extremity weakiness for 9 years. \par \par April 7, 2021\par /u Sjogren's/fibromyalgia, doing well on prednisone 2mg alt w 1mg since December. Occasional muscle pain behind breasts exacerbated by certain movements.  Awaiting a songram and mammogram. Has been losing weight (post COVID quarantine), but notes poor appetite since she increased to over 200lbs.  No fever, rash, anorexia, weight loss, alopecia, lymphadenopathy, chills, joint pain/swelling/stiffness, oral ulcers, fatigue, dry eyes, chest pain, shortness of breath, cough, anosmia, ageusia, nausea, vomiting, diarrhea, abdominal pain.  Is considering the COVID vaccine.\par \par October 14, 2021\par f/u Sjogren's/fibromyalgia; is feeling well.  Has been working 4 days a week for 5 hrs/day ([employer]); living in an apartment for 2 years. Occasional fatigue, has been losing weight intentionally.  She notes hand stiffness in the morning several days a week, no fever, rash, alopecia, lymphadenopathy, chills, joint pain/swelling, oral ulcers,  dry eyes, chest pain (she has a history of asthma and will be seeing a new pulmonolgist next month), shortness of breath, cough, anosmia, ageusia, nausea, vomiting, diarrhea, abdominal pain. Continues on prednisone 2mg alternating with 1 mg.  Received the Pfizer vaccine in April and May.  Her main complaint is of a reoccurrence since August  of right trochanteric pain (initially in 2011). 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  Notes occasional aches and pains, particularly her right arm--wrist and hands, using alleve and voltaren gel with minimal relief.  She is back with pain management (who has recommended tizanadine); saw gastro(@ [facility]-- [facility])  last week--endoscopy scheduled and omeprazole restarted (she has acid reflux since 2006). Told of HS by hidroadenitis suppurativa\par \par \par June 8, 2023\par f/u Sjogren's/fibromyalgia on prednisone 1mg (on occasion will take 2mg) since January.  Often related to how much she does the day before.  She occasionally will get pain on the top of her head.  She has had hot flashes (her menses are regular), however, she is followed by endocrine-- and recent tsh values have been low (she continues on the same dose of synthroid).  No fever, rash, anorexia, weight loss, alopecia, lymphadenopathy, chills, joint pain/swelling/stiffness, oral ulcers, fatigue, dry eyes, chest pain, shortness of breath, cough, anosmia, ageusia, nausea, vomiting, diarrhea, abdominal pain. \par \par Meds: Prednisone  1mg\par mgmg, Plaquenil 400mg last ophthal Dec 2021, (optho scheduled for 6/16)/23, prozac,  vit D3, symbicort bid, vit B12, vit D, synthroid 137.5,, cyclobenzprine (10mg q hs, alleve prn\par off: diclofenac 75 prn, naprosyn 500mg, tylenol 3 prn, \par \par PE: 210 lbs 129/84\par No rash , no alopecia, hair is growing, no oral or nasal ulcers, livedo, nodules, or lymphadenopathy. No synovitis/joint tenderness. Lungs: Clear without wheeze, rales, rhonch; Cor: S1S2 without murmur, gallop or rub; Abd: soft, NT, BS+;, Back: no spinal or paraspinal tenderness; Ext: no CCE; Neuro: non-focal\par \par \par Imp\par Sjogrens with fibromyalgia  on low dose prednisone, (previously on lyrica, topamax)--pain management to switch  flexeril to tozanadine.  She is doing well without signs or symptoms of inflammation by history or on exam.   Will continue plaquenil, will continue pred 1mg/d (she may try to change to qod over the summer).  Will check labs, including fsh and lh.  Will contact endocrine regarding tsh.\par . \par

## 2023-06-08 NOTE — ASSESSMENT
[FreeTextEntry1] : See Assessment within History of Present Illness\par \par 30 min spent with this encounter including face to face, chart review and charting

## 2023-06-09 LAB
ALBUMIN SERPL ELPH-MCNC: 4.3 G/DL
ALP BLD-CCNC: 81 U/L
ALT SERPL-CCNC: 16 U/L
ANION GAP SERPL CALC-SCNC: 14 MMOL/L
APPEARANCE: CLEAR
AST SERPL-CCNC: 15 U/L
BILIRUB SERPL-MCNC: 0.2 MG/DL
BILIRUBIN URINE: NEGATIVE
BLOOD URINE: NEGATIVE
BUN SERPL-MCNC: 13 MG/DL
C3 SERPL-MCNC: 131 MG/DL
C4 SERPL-MCNC: 31 MG/DL
CALCIUM SERPL-MCNC: 9.4 MG/DL
CHLORIDE SERPL-SCNC: 103 MMOL/L
CO2 SERPL-SCNC: 26 MMOL/L
COLOR: YELLOW
CREAT SERPL-MCNC: 0.72 MG/DL
EGFR: 111 ML/MIN/1.73M2
ERYTHROCYTE [SEDIMENTATION RATE] IN BLOOD BY WESTERGREN METHOD: 23 MM/HR
FSH SERPL-MCNC: 9.5 IU/L
GLUCOSE QUALITATIVE U: NEGATIVE MG/DL
GLUCOSE SERPL-MCNC: 110 MG/DL
KETONES URINE: NEGATIVE MG/DL
LEUKOCYTE ESTERASE URINE: NEGATIVE
LH SERPL-ACNC: 36 IU/L
NITRITE URINE: NEGATIVE
PH URINE: 6
POTASSIUM SERPL-SCNC: 4.7 MMOL/L
PROT SERPL-MCNC: 7 G/DL
PROTEIN URINE: NEGATIVE MG/DL
SODIUM SERPL-SCNC: 144 MMOL/L
SPECIFIC GRAVITY URINE: 1.02
UROBILINOGEN URINE: 0.2 MG/DL

## 2023-07-27 ENCOUNTER — APPOINTMENT (OUTPATIENT)
Dept: BREAST CENTER | Facility: CLINIC | Age: 38
End: 2023-07-27
Payer: MEDICARE

## 2023-07-27 ENCOUNTER — NON-APPOINTMENT (OUTPATIENT)
Age: 38
End: 2023-07-27

## 2023-07-27 VITALS
BODY MASS INDEX: 32.49 KG/M2 | DIASTOLIC BLOOD PRESSURE: 83 MMHG | HEIGHT: 67 IN | WEIGHT: 207 LBS | HEART RATE: 108 BPM | SYSTOLIC BLOOD PRESSURE: 129 MMHG

## 2023-07-27 DIAGNOSIS — N64.52 NIPPLE DISCHARGE: ICD-10-CM

## 2023-07-27 DIAGNOSIS — Z78.9 OTHER SPECIFIED HEALTH STATUS: ICD-10-CM

## 2023-07-27 PROCEDURE — 99203 OFFICE O/P NEW LOW 30 MIN: CPT

## 2023-07-27 RX ORDER — LEVOTHYROXINE SODIUM 175 UG/1
175 TABLET ORAL
Qty: 90 | Refills: 1 | Status: DISCONTINUED | COMMUNITY
Start: 2020-01-13 | End: 2023-07-27

## 2023-07-27 RX ORDER — FLUOXETINE HCL 10 MG
TABLET ORAL
Refills: 0 | Status: ACTIVE | COMMUNITY

## 2023-07-27 RX ORDER — CRANBERRY FRUIT EXTRACT 200 MG
CAPSULE ORAL
Refills: 0 | Status: ACTIVE | COMMUNITY

## 2023-07-27 RX ORDER — CETIRIZINE HCL 10 MG
TABLET ORAL
Refills: 0 | Status: ACTIVE | COMMUNITY

## 2023-07-27 RX ORDER — BUDESONIDE AND FORMOTEROL FUMARATE DIHYDRATE 160; 4.5 UG/1; UG/1
AEROSOL RESPIRATORY (INHALATION)
Refills: 0 | Status: ACTIVE | COMMUNITY

## 2023-07-27 RX ORDER — PREGABALIN 100 MG/1
100 CAPSULE ORAL AS DIRECTED
Qty: 90 | Refills: 0 | Status: DISCONTINUED | COMMUNITY
Start: 2017-01-13 | End: 2023-07-27

## 2023-07-27 RX ORDER — DICLOFENAC SODIUM 3 G/100G
3 GEL TOPICAL TWICE DAILY
Qty: 1 | Refills: 2 | Status: DISCONTINUED | COMMUNITY
Start: 2021-10-14 | End: 2023-07-27

## 2023-07-27 RX ORDER — NIFEDIPINE 30 MG/1
30 TABLET, EXTENDED RELEASE ORAL DAILY
Qty: 90 | Refills: 1 | Status: DISCONTINUED | COMMUNITY
Start: 2019-05-16 | End: 2023-07-27

## 2023-07-27 RX ORDER — VITAMIN B COMPLEX
CAPSULE ORAL
Refills: 0 | Status: ACTIVE | COMMUNITY

## 2023-07-27 RX ORDER — PSYLLIUM HUSK 0.4 G
CAPSULE ORAL
Refills: 0 | Status: ACTIVE | COMMUNITY

## 2023-07-27 RX ORDER — CLINDAMYCIN PHOSPHATE 10 MG/ML
1 SOLUTION TOPICAL TWICE DAILY
Qty: 1 | Refills: 0 | Status: DISCONTINUED | COMMUNITY
Start: 2022-10-13 | End: 2023-07-27

## 2023-07-27 NOTE — PAST MEDICAL HISTORY
[Menstruating] : The patient is menstruating [Menarche Age ____] : age at menarche was [unfilled] [Definite ___ (Date)] : the last menstrual period was [unfilled] [Total Preg ___] : G[unfilled] [Live Births ___] : P[unfilled]  [Age At Live Birth ___] : Age at live birth: [unfilled] [History of Hormone Replacement Treatment] : has no history of hormone replacement treatment [FreeTextEntry6] : no [FreeTextEntry7] : yes IUD [FreeTextEntry8] : yes for a few months

## 2023-07-27 NOTE — HISTORY OF PRESENT ILLNESS
[FreeTextEntry1] : KALEE is a 37 year old female, referred by Dr. Sunil Eid (PCP), who presents for initial evaluation regarding palpable abnormality of the right retroareolar breast noted in April 2023 and right non-spontaneous/spontaneous dark bloody nipple discharge. Patient reports experiencing a stabbing needle like sensation in her right retroareolar region while in the kitchen in April, patient states she felt an abnormality in the region of the pain and squeezed her breast and nipple, at which point she noted white nipple discharge with dark blood that followed. Patient states last episode of nipple discharge was noted in June, states it presents as scab at the end of her nipple which she removes. Denies stains on bra or shirt. Patient reports breast pain has persisted, is constant, but worse before her menstrual cycle. Of note, patient reports a history of right sided facial drooping, right foot drop (persistent), and right sided weakness after delivery of her son in 2010. Patient was worked up previously by a neurologist, without confirmation of a diagnosis. Patient has a history of neck and back pain and has had numerous epidural injections for treatment, last epidural was in 2018. Patient underwent a b/l diagnostic mammogram/US on 6/8/23 results showing in area of palpable concern a 0.5cm benign appearing minimally complicated cyst; however, patient was recommended for breast MRI for workup of the nipple discharge. Patient underwent a breast MRI on 6/29/23 and subsequent US core biopsy of enhancing right retroareolar mass seen on MRI with correlative lesion on US, US biopsy pathology yielded fat necrosis, felt to be secondary to trauma caused by manipulation of breast. \par \par Patient expressed interest in breast reduction, states she has bra groove indents in her shoulders, reports ongoing neck and back pain, and history of inflamed dermal lesions in her IMF folds. \par \par HUONG lifetime risk of 12.7%; Denies family history of breast or ovarian cancer. \par \par  \par \par \par \par

## 2023-07-27 NOTE — DATA REVIEWED
[FreeTextEntry1] : 6/8/23 (R) b/l dx mammogram/US: scattered areas of fibroglandular density, Right retroareolar region, area of palpable concern, 0.4 x 0.4 x 0.5 cm benign-appearing minimally complicated cyst. \par IMPRESSION: No mammographic or ultrasonographic evidence of malignancy. Recommend breast MRI with contrast for further evaluation of the patient's bloody right nipple discharge. FOLLOW-UP: Breast MRI. ASSESSMENT: BI-RADS Category 0\par \par 6/29/23 (R) b/l breast MRI:  Indeterminate directly right retroareolar round enhancing mass, 0.5cm. In this patient with hemorrhagic nipple discharge, biopsy is recommended. It is not amenable to MRI biopsy. However, it may correspond to right breast complex cystic mass identified on diagnostic breast ultrasound dated 6/8/2023. Therefore, ultrasound-guided core biopsy of this sonographically apparent lesion is recommended. Additionally, surgical consultation is recommended.  Enhancing left dermal lesion. Clinical correlation and dermatologic consult is recommended as clinically indicated. BIRADS 4 \par \par 7/13/23 ([facility]): fat necrosis, benign concordant, recommended 6 month follow up MRI, Patient's bloody nipple discharge was additionally discussed. She reports that the first time it happened it was with manipulation. Recommend avoiding manipulation. If spontaneous bloody nipple discharge continues than a surgical referral would be recommended for possible terminal duct surgical biopsy. Additionally, patient complains of breast pain, which may be secondary to the trauma that resulted in fat necrosis. \par

## 2023-07-27 NOTE — PHYSICAL EXAM
[Normocephalic] : normocephalic [Examined in the supine and seated position] : examined in the supine and seated position [No dominant masses] : no dominant masses in right breast  [No dominant masses] : no dominant masses left breast [No Nipple Retraction] : no left nipple retraction [No Nipple Discharge] : no left nipple discharge [No Axillary Lymphadenopathy] : no left axillary lymphadenopathy [No Edema] : no edema [No Swelling] : no swelling [No Rashes] : no rashes [No Ulceration] : no ulceration [Breast Nipple Inversion] : nipples not inverted [Breast Nipple Retraction] : nipples not retracted [Breast Nipple Flattening] : nipples not flattened [Breast Nipple Fissures] : nipples not fissured [Breast Abnormal Lactation (Galactorrhea)] : no galactorrhea [Breast Abnormal Secretion Bloody Fluid] : no bloody discharge [Breast Abnormal Secretion Serous Fluid] : no serous discharge [Breast Abnormal Secretion Opalescent Fluid] : no milky discharge [de-identified] : lower quadrant resolving inflamed dermal lesion

## 2023-09-07 ENCOUNTER — APPOINTMENT (OUTPATIENT)
Dept: BREAST CENTER | Facility: CLINIC | Age: 38
End: 2023-09-07

## 2023-09-12 ENCOUNTER — NON-APPOINTMENT (OUTPATIENT)
Age: 38
End: 2023-09-12

## 2023-09-12 ENCOUNTER — TRANSCRIPTION ENCOUNTER (OUTPATIENT)
Age: 38
End: 2023-09-12

## 2023-09-15 ENCOUNTER — APPOINTMENT (OUTPATIENT)
Dept: BREAST CENTER | Facility: CLINIC | Age: 38
End: 2023-09-15

## 2023-09-18 LAB
T3FREE SERPL-MCNC: 2.7 PG/ML
T4 FREE SERPL-MCNC: 1.4 NG/DL
TSH RECEPTOR AB: <1.1 IU/L
TSH SERPL-ACNC: 0.04 UIU/ML

## 2023-09-20 ENCOUNTER — TRANSCRIPTION ENCOUNTER (OUTPATIENT)
Age: 38
End: 2023-09-20

## 2023-09-20 ENCOUNTER — RX RENEWAL (OUTPATIENT)
Age: 38
End: 2023-09-20

## 2023-09-20 RX ORDER — LEVOTHYROXINE SODIUM 137 UG/1
137 TABLET ORAL DAILY
Qty: 90 | Refills: 3 | Status: ACTIVE | COMMUNITY
Start: 2018-06-07 | End: 1900-01-01

## 2023-09-22 ENCOUNTER — TRANSCRIPTION ENCOUNTER (OUTPATIENT)
Age: 38
End: 2023-09-22

## 2023-10-05 ENCOUNTER — NON-APPOINTMENT (OUTPATIENT)
Age: 38
End: 2023-10-05

## 2023-10-05 ENCOUNTER — APPOINTMENT (OUTPATIENT)
Dept: BREAST CENTER | Facility: CLINIC | Age: 38
End: 2023-10-05
Payer: MEDICARE

## 2023-10-05 VITALS
SYSTOLIC BLOOD PRESSURE: 132 MMHG | HEART RATE: 83 BPM | WEIGHT: 212 LBS | HEIGHT: 67 IN | BODY MASS INDEX: 33.27 KG/M2 | DIASTOLIC BLOOD PRESSURE: 88 MMHG

## 2023-10-05 DIAGNOSIS — N64.4 MASTODYNIA: ICD-10-CM

## 2023-10-05 DIAGNOSIS — R92.8 OTHER ABNORMAL AND INCONCLUSIVE FINDINGS ON DIAGNOSTIC IMAGING OF BREAST: ICD-10-CM

## 2023-10-05 PROCEDURE — 99213 OFFICE O/P EST LOW 20 MIN: CPT

## 2023-10-12 ENCOUNTER — APPOINTMENT (OUTPATIENT)
Dept: ENDOCRINOLOGY | Facility: CLINIC | Age: 38
End: 2023-10-12

## 2023-10-12 DIAGNOSIS — E03.9 HYPOTHYROIDISM, UNSPECIFIED: ICD-10-CM

## 2023-10-18 ENCOUNTER — NON-APPOINTMENT (OUTPATIENT)
Age: 38
End: 2023-10-18

## 2023-11-16 ENCOUNTER — APPOINTMENT (OUTPATIENT)
Dept: PLASTIC SURGERY | Facility: CLINIC | Age: 38
End: 2023-11-16

## 2023-11-21 ENCOUNTER — TRANSCRIPTION ENCOUNTER (OUTPATIENT)
Age: 38
End: 2023-11-21

## 2023-11-21 LAB
T3 SERPL-MCNC: 113 NG/DL
T4 FREE SERPL-MCNC: 1.4 NG/DL
THYROGLOB AB SERPL-ACNC: 103.5 IU/ML
THYROPEROXIDASE AB SERPL IA-ACNC: 11 IU/ML
TSH SERPL-ACNC: 0.08 UIU/ML

## 2023-12-07 ENCOUNTER — APPOINTMENT (OUTPATIENT)
Dept: RHEUMATOLOGY | Facility: CLINIC | Age: 38
End: 2023-12-07
Payer: MEDICARE

## 2023-12-07 VITALS
RESPIRATION RATE: 16 BRPM | WEIGHT: 212 LBS | DIASTOLIC BLOOD PRESSURE: 83 MMHG | OXYGEN SATURATION: 98 % | TEMPERATURE: 97.4 F | HEART RATE: 88 BPM | BODY MASS INDEX: 33.27 KG/M2 | SYSTOLIC BLOOD PRESSURE: 146 MMHG | HEIGHT: 67 IN

## 2023-12-07 DIAGNOSIS — Z00.00 ENCOUNTER FOR GENERAL ADULT MEDICAL EXAMINATION W/OUT ABNORMAL FINDINGS: ICD-10-CM

## 2023-12-07 DIAGNOSIS — M35.00 SICCA SYNDROME, UNSPECIFIED: ICD-10-CM

## 2023-12-07 PROCEDURE — 99214 OFFICE O/P EST MOD 30 MIN: CPT

## 2023-12-08 LAB
ALBUMIN SERPL ELPH-MCNC: 4.1 G/DL
ALP BLD-CCNC: 80 U/L
ALT SERPL-CCNC: 21 U/L
ANION GAP SERPL CALC-SCNC: 9 MMOL/L
APPEARANCE: CLEAR
AST SERPL-CCNC: 16 U/L
BASOPHILS # BLD AUTO: 0.03 K/UL
BASOPHILS NFR BLD AUTO: 0.8 %
BILIRUB SERPL-MCNC: 0.2 MG/DL
BILIRUBIN URINE: NEGATIVE
BLOOD URINE: NEGATIVE
BUN SERPL-MCNC: 11 MG/DL
C3 SERPL-MCNC: 126 MG/DL
C4 SERPL-MCNC: 32 MG/DL
CALCIUM SERPL-MCNC: 9.2 MG/DL
CHLORIDE SERPL-SCNC: 105 MMOL/L
CO2 SERPL-SCNC: 28 MMOL/L
COLOR: YELLOW
CREAT SERPL-MCNC: 0.66 MG/DL
EGFR: 117 ML/MIN/1.73M2
EOSINOPHIL # BLD AUTO: 0.07 K/UL
EOSINOPHIL NFR BLD AUTO: 1.8 %
ERYTHROCYTE [SEDIMENTATION RATE] IN BLOOD BY WESTERGREN METHOD: 15 MM/HR
GLUCOSE QUALITATIVE U: NEGATIVE MG/DL
GLUCOSE SERPL-MCNC: 95 MG/DL
HCT VFR BLD CALC: 40.5 %
HGB BLD-MCNC: 12.1 G/DL
IMM GRANULOCYTES NFR BLD AUTO: 0.3 %
KETONES URINE: NEGATIVE MG/DL
LEUKOCYTE ESTERASE URINE: NEGATIVE
LYMPHOCYTES # BLD AUTO: 1.46 K/UL
LYMPHOCYTES NFR BLD AUTO: 38 %
MAN DIFF?: NORMAL
MCHC RBC-ENTMCNC: 26.5 PG
MCHC RBC-ENTMCNC: 29.9 GM/DL
MCV RBC AUTO: 88.6 FL
MONOCYTES # BLD AUTO: 0.21 K/UL
MONOCYTES NFR BLD AUTO: 5.5 %
NEUTROPHILS # BLD AUTO: 2.06 K/UL
NEUTROPHILS NFR BLD AUTO: 53.6 %
NITRITE URINE: NEGATIVE
PH URINE: 7.5
PLATELET # BLD AUTO: 316 K/UL
POTASSIUM SERPL-SCNC: 4.9 MMOL/L
PROT SERPL-MCNC: 6.8 G/DL
PROTEIN URINE: NEGATIVE MG/DL
RBC # BLD: 4.57 M/UL
RBC # FLD: 14.1 %
SODIUM SERPL-SCNC: 142 MMOL/L
SPECIFIC GRAVITY URINE: 1.01
UROBILINOGEN URINE: 0.2 MG/DL
WBC # FLD AUTO: 3.84 K/UL

## 2023-12-11 RX ORDER — HYDROXYCHLOROQUINE SULFATE 200 MG/1
200 TABLET, FILM COATED ORAL
Qty: 180 | Refills: 3 | Status: ACTIVE | COMMUNITY
Start: 2017-05-11 | End: 1900-01-01

## 2023-12-27 ENCOUNTER — NON-APPOINTMENT (OUTPATIENT)
Age: 38
End: 2023-12-27

## 2024-01-25 ENCOUNTER — APPOINTMENT (OUTPATIENT)
Dept: BREAST CENTER | Facility: CLINIC | Age: 39
End: 2024-01-25

## 2024-01-29 ENCOUNTER — NON-APPOINTMENT (OUTPATIENT)
Age: 39
End: 2024-01-29

## 2024-01-29 ENCOUNTER — TRANSCRIPTION ENCOUNTER (OUTPATIENT)
Age: 39
End: 2024-01-29

## 2024-01-30 ENCOUNTER — NON-APPOINTMENT (OUTPATIENT)
Age: 39
End: 2024-01-30

## 2024-04-05 ENCOUNTER — NON-APPOINTMENT (OUTPATIENT)
Age: 39
End: 2024-04-05

## 2024-09-02 ENCOUNTER — RX RENEWAL (OUTPATIENT)
Age: 39
End: 2024-09-02

## 2024-09-12 ENCOUNTER — APPOINTMENT (OUTPATIENT)
Dept: RHEUMATOLOGY | Facility: CLINIC | Age: 39
End: 2024-09-12

## 2025-04-03 ENCOUNTER — LABORATORY RESULT (OUTPATIENT)
Age: 40
End: 2025-04-03

## 2025-04-03 ENCOUNTER — NON-APPOINTMENT (OUTPATIENT)
Age: 40
End: 2025-04-03

## 2025-04-03 ENCOUNTER — APPOINTMENT (OUTPATIENT)
Dept: RHEUMATOLOGY | Facility: CLINIC | Age: 40
End: 2025-04-03
Payer: MEDICARE

## 2025-04-03 VITALS
BODY MASS INDEX: 35.47 KG/M2 | DIASTOLIC BLOOD PRESSURE: 86 MMHG | HEART RATE: 95 BPM | SYSTOLIC BLOOD PRESSURE: 153 MMHG | TEMPERATURE: 97.1 F | HEIGHT: 67 IN | OXYGEN SATURATION: 98 % | WEIGHT: 226 LBS

## 2025-04-03 DIAGNOSIS — M35.00 SICCA SYNDROME, UNSPECIFIED: ICD-10-CM

## 2025-04-03 PROCEDURE — 99214 OFFICE O/P EST MOD 30 MIN: CPT

## 2025-04-04 LAB
ALBUMIN SERPL ELPH-MCNC: 3.8 G/DL
ALP BLD-CCNC: 86 U/L
ALT SERPL-CCNC: 16 U/L
ANION GAP SERPL CALC-SCNC: 12 MMOL/L
APPEARANCE: CLEAR
AST SERPL-CCNC: 15 U/L
BASOPHILS # BLD AUTO: 0.02 K/UL
BASOPHILS NFR BLD AUTO: 0.4 %
BILIRUB SERPL-MCNC: <0.2 MG/DL
BILIRUBIN URINE: NEGATIVE
BLOOD URINE: NEGATIVE
BUN SERPL-MCNC: 8 MG/DL
C3 SERPL-MCNC: 153 MG/DL
C4 SERPL-MCNC: 38 MG/DL
CALCIUM SERPL-MCNC: 9.4 MG/DL
CHLORIDE SERPL-SCNC: 103 MMOL/L
CO2 SERPL-SCNC: 22 MMOL/L
COLOR: YELLOW
CONFIRM: 26.5 SEC
CREAT SERPL-MCNC: 0.43 MG/DL
CREAT SPEC-SCNC: 62 MG/DL
CREAT/PROT UR: 0.1 RATIO
DRVVT IMM 1:2 NP PPP: NORMAL
DRVVT SCREEN TO CONFIRM RATIO: 0.89 RATIO
DSDNA AB SER-ACNC: <1 IU/ML
EGFRCR SERPLBLD CKD-EPI 2021: 128 ML/MIN/1.73M2
ENA RNP AB SER IA-ACNC: <0.2 AL
ENA SM AB SER IA-ACNC: <0.2 AL
ENA SS-A AB SER IA-ACNC: >8 AL
ENA SS-B AB SER IA-ACNC: <0.2 AL
EOSINOPHIL # BLD AUTO: 0.18 K/UL
EOSINOPHIL NFR BLD AUTO: 3.2 %
ERYTHROCYTE [SEDIMENTATION RATE] IN BLOOD BY WESTERGREN METHOD: 57 MM/HR
GLUCOSE QUALITATIVE U: NEGATIVE MG/DL
GLUCOSE SERPL-MCNC: 87 MG/DL
HCT VFR BLD CALC: 39 %
HGB BLD-MCNC: 12.8 G/DL
IMM GRANULOCYTES NFR BLD AUTO: 0.2 %
KETONES URINE: NEGATIVE MG/DL
LEUKOCYTE ESTERASE URINE: NEGATIVE
LUPUS ANTICOAGULANT CASCADE REFLEX: NORMAL
LYMPHOCYTES # BLD AUTO: 1.64 K/UL
LYMPHOCYTES NFR BLD AUTO: 28.8 %
MAN DIFF?: NORMAL
MCHC RBC-ENTMCNC: 29 PG
MCHC RBC-ENTMCNC: 32.8 G/DL
MCV RBC AUTO: 88.2 FL
MONOCYTES # BLD AUTO: 0.45 K/UL
MONOCYTES NFR BLD AUTO: 7.9 %
NEUTROPHILS # BLD AUTO: 3.39 K/UL
NEUTROPHILS NFR BLD AUTO: 59.5 %
NITRITE URINE: NEGATIVE
PH URINE: 6.5
PLATELET # BLD AUTO: 364 K/UL
POTASSIUM SERPL-SCNC: 4.1 MMOL/L
PROT SERPL-MCNC: 6.8 G/DL
PROT UR-MCNC: 5 MG/DL
PROTEIN URINE: NEGATIVE MG/DL
RBC # BLD: 4.42 M/UL
RBC # FLD: 14.2 %
SCREEN DRVVT: 30.5 SEC
SODIUM SERPL-SCNC: 137 MMOL/L
SPECIFIC GRAVITY URINE: 1.01
UROBILINOGEN URINE: 0.2 MG/DL
WBC # FLD AUTO: 5.69 K/UL

## 2025-05-01 ENCOUNTER — NON-APPOINTMENT (OUTPATIENT)
Age: 40
End: 2025-05-01

## 2025-05-01 ENCOUNTER — APPOINTMENT (OUTPATIENT)
Dept: BREAST CENTER | Facility: CLINIC | Age: 40
End: 2025-05-01
Payer: MEDICARE

## 2025-05-01 VITALS
SYSTOLIC BLOOD PRESSURE: 118 MMHG | HEIGHT: 67 IN | DIASTOLIC BLOOD PRESSURE: 79 MMHG | BODY MASS INDEX: 35.16 KG/M2 | HEART RATE: 97 BPM | WEIGHT: 224 LBS

## 2025-05-01 DIAGNOSIS — N64.59 OTHER SIGNS AND SYMPTOMS IN BREAST: ICD-10-CM

## 2025-05-01 DIAGNOSIS — N64.4 MASTODYNIA: ICD-10-CM

## 2025-05-01 DIAGNOSIS — R92.8 OTHER ABNORMAL AND INCONCLUSIVE FINDINGS ON DIAGNOSTIC IMAGING OF BREAST: ICD-10-CM

## 2025-05-01 DIAGNOSIS — N64.52 NIPPLE DISCHARGE: ICD-10-CM

## 2025-05-01 PROCEDURE — 99213 OFFICE O/P EST LOW 20 MIN: CPT

## 2025-05-01 RX ORDER — PRENATAL VIT CALC,IRON,FOLIC
TABLET ORAL
Refills: 0 | Status: ACTIVE | COMMUNITY

## 2025-05-29 ENCOUNTER — NON-APPOINTMENT (OUTPATIENT)
Age: 40
End: 2025-05-29

## 2025-05-29 ENCOUNTER — TRANSCRIPTION ENCOUNTER (OUTPATIENT)
Age: 40
End: 2025-05-29

## 2025-05-29 DIAGNOSIS — R92.8 OTHER ABNORMAL AND INCONCLUSIVE FINDINGS ON DIAGNOSTIC IMAGING OF BREAST: ICD-10-CM

## 2025-06-25 ENCOUNTER — NON-APPOINTMENT (OUTPATIENT)
Age: 40
End: 2025-06-25

## 2025-06-25 ENCOUNTER — APPOINTMENT (OUTPATIENT)
Dept: BREAST CENTER | Facility: CLINIC | Age: 40
End: 2025-06-25

## 2025-07-03 ENCOUNTER — APPOINTMENT (OUTPATIENT)
Dept: RHEUMATOLOGY | Facility: CLINIC | Age: 40
End: 2025-07-03
Payer: MEDICARE

## 2025-07-03 VITALS
OXYGEN SATURATION: 98 % | BODY MASS INDEX: 35.94 KG/M2 | TEMPERATURE: 97.7 F | SYSTOLIC BLOOD PRESSURE: 110 MMHG | DIASTOLIC BLOOD PRESSURE: 80 MMHG | HEART RATE: 93 BPM | WEIGHT: 229 LBS | HEIGHT: 67 IN

## 2025-07-03 PROCEDURE — 99214 OFFICE O/P EST MOD 30 MIN: CPT

## 2025-07-04 LAB
ALBUMIN SERPL ELPH-MCNC: 3.7 G/DL
ALP BLD-CCNC: 83 U/L
ALT SERPL-CCNC: 17 U/L
ANION GAP SERPL CALC-SCNC: 12 MMOL/L
APPEARANCE: CLEAR
AST SERPL-CCNC: 15 U/L
BASOPHILS # BLD AUTO: 0.02 K/UL
BASOPHILS NFR BLD AUTO: 0.3 %
BILIRUB SERPL-MCNC: <0.2 MG/DL
BILIRUBIN URINE: NEGATIVE
BLOOD URINE: NEGATIVE
BUN SERPL-MCNC: 7 MG/DL
C3 SERPL-MCNC: 156 MG/DL
C4 SERPL-MCNC: 35 MG/DL
CALCIUM SERPL-MCNC: 9.4 MG/DL
CHLORIDE SERPL-SCNC: 104 MMOL/L
CO2 SERPL-SCNC: 22 MMOL/L
COLOR: YELLOW
CREAT SERPL-MCNC: 0.51 MG/DL
CREAT SPEC-SCNC: 125 MG/DL
CREAT/PROT UR: 0.1 RATIO
EGFRCR SERPLBLD CKD-EPI 2021: 122 ML/MIN/1.73M2
EOSINOPHIL # BLD AUTO: 0.28 K/UL
EOSINOPHIL NFR BLD AUTO: 4.8 %
ERYTHROCYTE [SEDIMENTATION RATE] IN BLOOD BY WESTERGREN METHOD: 23 MM/HR
GLUCOSE QUALITATIVE U: NEGATIVE MG/DL
GLUCOSE SERPL-MCNC: 89 MG/DL
HCT VFR BLD CALC: 38.6 %
HGB BLD-MCNC: 12 G/DL
IMM GRANULOCYTES NFR BLD AUTO: 0.3 %
KETONES URINE: 15 MG/DL
LEUKOCYTE ESTERASE URINE: NEGATIVE
LYMPHOCYTES # BLD AUTO: 1.59 K/UL
LYMPHOCYTES NFR BLD AUTO: 27.3 %
MAN DIFF?: NORMAL
MCHC RBC-ENTMCNC: 27.6 PG
MCHC RBC-ENTMCNC: 31.1 G/DL
MCV RBC AUTO: 88.9 FL
MONOCYTES # BLD AUTO: 0.32 K/UL
MONOCYTES NFR BLD AUTO: 5.5 %
NEUTROPHILS # BLD AUTO: 3.6 K/UL
NEUTROPHILS NFR BLD AUTO: 61.8 %
NITRITE URINE: NEGATIVE
PH URINE: 6.5
PLATELET # BLD AUTO: 370 K/UL
POTASSIUM SERPL-SCNC: 5 MMOL/L
PROT SERPL-MCNC: 6.5 G/DL
PROT UR-MCNC: 10 MG/DL
PROTEIN URINE: NORMAL MG/DL
RBC # BLD: 4.34 M/UL
RBC # FLD: 13.3 %
SODIUM SERPL-SCNC: 138 MMOL/L
SPECIFIC GRAVITY URINE: 1.02
UROBILINOGEN URINE: 0.2 MG/DL
WBC # FLD AUTO: 5.83 K/UL

## 2025-07-07 LAB — DSDNA AB SER-ACNC: <1 IU/ML

## 2025-08-13 ENCOUNTER — NON-APPOINTMENT (OUTPATIENT)
Age: 40
End: 2025-08-13

## 2025-08-19 ENCOUNTER — NON-APPOINTMENT (OUTPATIENT)
Age: 40
End: 2025-08-19